# Patient Record
Sex: FEMALE | NOT HISPANIC OR LATINO | Employment: FULL TIME | URBAN - METROPOLITAN AREA
[De-identification: names, ages, dates, MRNs, and addresses within clinical notes are randomized per-mention and may not be internally consistent; named-entity substitution may affect disease eponyms.]

---

## 2020-09-04 ENCOUNTER — ROUTINE PRENATAL (OUTPATIENT)
Dept: OBSTETRICS AND GYNECOLOGY | Facility: CLINIC | Age: 30
End: 2020-09-04

## 2020-09-04 VITALS — WEIGHT: 135.9 LBS | DIASTOLIC BLOOD PRESSURE: 70 MMHG | SYSTOLIC BLOOD PRESSURE: 114 MMHG

## 2020-09-04 DIAGNOSIS — Z3A.28 28 WEEKS GESTATION OF PREGNANCY: Primary | ICD-10-CM

## 2020-09-04 LAB
GLUCOSE UR STRIP-MCNC: NEGATIVE MG/DL
GLUCOSE UR STRIP-MCNC: NEGATIVE MG/DL
PROT UR STRIP-MCNC: NEGATIVE MG/DL
PROT UR STRIP-MCNC: NEGATIVE MG/DL

## 2020-09-04 PROCEDURE — 0502F SUBSEQUENT PRENATAL CARE: CPT | Performed by: OBSTETRICS & GYNECOLOGY

## 2020-09-04 RX ORDER — BREAST PUMP
EACH MISCELLANEOUS
Qty: 1 EACH | Refills: 0 | Status: SHIPPED | OUTPATIENT
Start: 2020-09-04 | End: 2021-04-15

## 2020-09-04 NOTE — PROGRESS NOTES
OB FOLLOW UP    Marian Samuels is a 29 y.o.  27w6d patient being seen today for her obstetrical follow up visit. Patient reports no complaints.     Her prenatal care is complicated by (and status) :    There is no problem list on file for this patient.      ROS -   Patient Reports : No Problems  Patient Denies: No Problems  Fetal Movement : normal      /70   Wt 61.6 kg (135 lb 14.4 oz)   LMP 02/10/2020     Ultrasound Today: no    EXAM:  Vitals: See prenatal flowsheet   Abdomen: See prenatal flowsheet   Urine glucose/protein: See prenatal flowsheet   Pelvic: See prenatal flowsheet     Assessment    1. Pregnancy at 27w6d  2. Fetal status reassuring     Problem List Items Addressed This Visit     None      Visit Diagnoses     28 weeks gestation of pregnancy    -  Primary    Relevant Medications    Misc. Devices (BREAST PUMP) misc    Other Relevant Orders    POC Urinalysis Dipstick    Glucose, Post 50 Gm Glucola          Plan    1. glucola today, breast pump rx, fu 4 wks, plan US 34 wks    Mariluz Block MD  2020

## 2020-09-05 LAB
BLD GP AB SCN SERPL QL: NEGATIVE
ERYTHROCYTE [DISTWIDTH] IN BLOOD BY AUTOMATED COUNT: 11.7 % (ref 12.3–15.4)
GLUCOSE 1H P 50 G GLC PO SERPL-MCNC: 93 MG/DL (ref 65–139)
HCT VFR BLD AUTO: 27.4 % (ref 34–46.6)
HGB BLD-MCNC: 9.5 G/DL (ref 12–15.9)
MCH RBC QN AUTO: 31.3 PG (ref 26.6–33)
MCHC RBC AUTO-ENTMCNC: 34.7 G/DL (ref 31.5–35.7)
MCV RBC AUTO: 90.1 FL (ref 79–97)
PLATELET # BLD AUTO: 258 10*3/MM3 (ref 140–450)
RBC # BLD AUTO: 3.04 10*6/MM3 (ref 3.77–5.28)
WBC # BLD AUTO: 9.94 10*3/MM3 (ref 3.4–10.8)

## 2020-09-17 ENCOUNTER — TELEPHONE (OUTPATIENT)
Dept: OBSTETRICS AND GYNECOLOGY | Facility: CLINIC | Age: 30
End: 2020-09-17

## 2020-09-17 NOTE — TELEPHONE ENCOUNTER
Marian had a question about her breast pump, she said  sent a prosection for it and the pharmacy said they don't do that

## 2020-10-05 ENCOUNTER — ROUTINE PRENATAL (OUTPATIENT)
Dept: OBSTETRICS AND GYNECOLOGY | Facility: CLINIC | Age: 30
End: 2020-10-05

## 2020-10-05 VITALS — SYSTOLIC BLOOD PRESSURE: 98 MMHG | DIASTOLIC BLOOD PRESSURE: 60 MMHG | WEIGHT: 143 LBS

## 2020-10-05 DIAGNOSIS — Z3A.32 32 WEEKS GESTATION OF PREGNANCY: Primary | ICD-10-CM

## 2020-10-05 DIAGNOSIS — Z3A.28 28 WEEKS GESTATION OF PREGNANCY: ICD-10-CM

## 2020-10-05 DIAGNOSIS — R30.0 DYSURIA: ICD-10-CM

## 2020-10-05 LAB
BILIRUB BLD-MCNC: NEGATIVE MG/DL
GLUCOSE UR STRIP-MCNC: NEGATIVE MG/DL
KETONES UR QL STRIP: ABNORMAL
LEUKOCYTE EST, POC: ABNORMAL
NITRITE UR-MCNC: NEGATIVE MG/ML
PH UR: 7 [PH] (ref 5–8)
PROT UR STRIP-MCNC: ABNORMAL MG/DL
RBC # UR STRIP: NEGATIVE /UL
SP GR UR: 1.02 (ref 1–1.03)
UROBILINOGEN UR QL: NORMAL

## 2020-10-05 PROCEDURE — 0502F SUBSEQUENT PRENATAL CARE: CPT | Performed by: OBSTETRICS & GYNECOLOGY

## 2020-10-05 RX ORDER — PRENATAL VIT NO.126/IRON/FOLIC 28MG-0.8MG
TABLET ORAL DAILY
COMMUNITY
End: 2021-04-15

## 2020-10-05 NOTE — PROGRESS NOTES
"OB FOLLOW UP    Marian Samuels is a 30 y.o.  32w2d patient being seen today for her obstetrical follow up visit. Patient reports episode of \"gush of fuid\" during IC on 10/1.   She also reports intermittent episodes of decreased FM since last visit.  She has a doppler that she uses at home for reassurance.  Approx.2 wks ago she had dysuria that resolved without treatment.  CCUA today.         Pt reports sob over the past 2 weeks with minimal exertion.  She is taking PNV as well as iron and vitamin d.  She was anemic on 28wk labs.   ROS -      Vaginal bleeding denies    BP 98/60   Wt 64.9 kg (143 lb)   LMP 02/10/2020     FHT:  present   Pelvic Exam: Performed: No     Assessment    1. Pregnancy at 32w2d  2. Fetal status reassuring  3. Ultrasound was performed.     4. TDAP was not already given    Problem List Items Addressed This Visit     None      Visit Diagnoses     32 weeks gestation of pregnancy    -  Primary    Relevant Orders    US Fetal Biophysical Profile;Without Non-Stress Testing    US Ob Follow Up Transabdominal Approach    POC Urinalysis Dipstick (Completed)    Urine Culture - Urine, Urine, Clean Catch    28 weeks gestation of pregnancy        Dysuria        Relevant Orders    Urine Culture - Urine, Urine, Clean Catch          Plan      1. Reviewed kick counts.  2. anemia- discussed iron. she is having troubler taking, may order IV- check ferritin today  3. Ultrasound today was baby breech, repeat position check 36 wks  4. Follow up: 2 weeks  5. Call for any problems    Mariluz Block MD  10/05/2020  "

## 2020-10-06 LAB — FERRITIN SERPL-MCNC: 11.1 NG/ML (ref 13–150)

## 2020-10-07 LAB
BACTERIA UR CULT: NORMAL
BACTERIA UR CULT: NORMAL

## 2020-10-15 PROBLEM — D50.9 IRON DEFICIENCY ANEMIA DURING PREGNANCY: Status: ACTIVE | Noted: 2020-10-15

## 2020-10-15 PROBLEM — O99.019 IRON DEFICIENCY ANEMIA DURING PREGNANCY: Status: ACTIVE | Noted: 2020-10-15

## 2020-10-15 RX ORDER — DIPHENHYDRAMINE HYDROCHLORIDE 50 MG/ML
50 INJECTION INTRAMUSCULAR; INTRAVENOUS AS NEEDED
Status: CANCELLED | OUTPATIENT
Start: 2020-10-16

## 2020-10-22 ENCOUNTER — ROUTINE PRENATAL (OUTPATIENT)
Dept: OBSTETRICS AND GYNECOLOGY | Facility: CLINIC | Age: 30
End: 2020-10-22

## 2020-10-22 VITALS
HEIGHT: 62 IN | DIASTOLIC BLOOD PRESSURE: 60 MMHG | BODY MASS INDEX: 27.22 KG/M2 | SYSTOLIC BLOOD PRESSURE: 118 MMHG | WEIGHT: 147.9 LBS

## 2020-10-22 DIAGNOSIS — Z3A.34 34 WEEKS GESTATION OF PREGNANCY: Primary | ICD-10-CM

## 2020-10-22 LAB
EXPIRATION DATE: 0
GLUCOSE UR STRIP-MCNC: NEGATIVE MG/DL
Lab: 0
PROT UR STRIP-MCNC: NEGATIVE MG/DL

## 2020-10-22 PROCEDURE — 90471 IMMUNIZATION ADMIN: CPT | Performed by: OBSTETRICS & GYNECOLOGY

## 2020-10-22 PROCEDURE — 90715 TDAP VACCINE 7 YRS/> IM: CPT | Performed by: OBSTETRICS & GYNECOLOGY

## 2020-10-22 PROCEDURE — 0502F SUBSEQUENT PRENATAL CARE: CPT | Performed by: OBSTETRICS & GYNECOLOGY

## 2020-10-22 NOTE — PROGRESS NOTES
"OB FOLLOW UP  CC- Here for care of pregnancy        Marian Samuels is a 30 y.o.  34w5d patient being seen today for her obstetrical follow up visit. Patient reports no complaints.     Her prenatal care is complicated by (and status) :    Patient Active Problem List   Diagnosis   • Iron deficiency anemia during pregnancy       Flu Status: Already given in current flu season    The additional following portions of the patient's history were reviewed and updated as appropriate: allergies, current medications, past family history, past medical history, past social history, past surgical history and problem list.    ROS -   Patient Reports : No Problems  Patient Denies: Loss of Fluid, Vaginal Spotting, Vision Changes, Headaches and Cramping/Contractions   Fetal Movement : normal, > 10  All other systems reviewed and are negative.     I have reviewed and agree with the HPI, ROS, and historical information as entered above. Mariluz Block MD    /60   Ht 157.5 cm (62\")   Wt 67.1 kg (147 lb 14.4 oz)   LMP 02/10/2020   BMI 27.05 kg/m²     Ultrasound Today: yes    EXAM:   Vitals: See prenatal flowsheet   Abdomen: See prenatal flowsheet   Urine glucose/protein: See prenatal flowsheet   Pelvic: See prenatal flowsheet   HRT: See prenatal flowsheet   Presentation: See prenatal flowsheet   Movement: See prenatal flowsheet          Assessment and Plan    Problem List Items Addressed This Visit     None      Visit Diagnoses     34 weeks gestation of pregnancy    -  Primary    Relevant Orders    US Ob Limited 1 + Fetuses    POC Glucose, Urine, Qualitative, Dipstick (Completed)    POC Protein, Urine, Qualitative, Dipstick (Completed)    Tdap Vaccine Greater Than or Equal To 8yo IM (Completed)          1. Pregnancy at 34w5d  2. Fetal status reassuring.   3. Activity and Exercise discussed.  Return in about 2 weeks (around 2020) for F/U Prenatal.    Mariluz Block MD  10/22/2020  "

## 2020-10-26 ENCOUNTER — HOSPITAL ENCOUNTER (OUTPATIENT)
Dept: ONCOLOGY | Facility: HOSPITAL | Age: 30
Setting detail: INFUSION SERIES
Discharge: HOME | End: 2020-10-26

## 2020-10-26 VITALS
DIASTOLIC BLOOD PRESSURE: 60 MMHG | RESPIRATION RATE: 14 BRPM | SYSTOLIC BLOOD PRESSURE: 103 MMHG | HEART RATE: 81 BPM | BODY MASS INDEX: 26.72 KG/M2 | TEMPERATURE: 97.5 F | WEIGHT: 146.1 LBS

## 2020-10-26 DIAGNOSIS — O99.019 IRON DEFICIENCY ANEMIA DURING PREGNANCY: Primary | ICD-10-CM

## 2020-10-26 DIAGNOSIS — D50.9 IRON DEFICIENCY ANEMIA DURING PREGNANCY: Primary | ICD-10-CM

## 2020-10-26 PROCEDURE — 25010000002 FERRIC CARBOXYMALTOSE 750 MG/15ML SOLUTION 15 ML VIAL: Performed by: OBSTETRICS & GYNECOLOGY

## 2020-10-26 PROCEDURE — 96375 TX/PRO/DX INJ NEW DRUG ADDON: CPT

## 2020-10-26 PROCEDURE — 96374 THER/PROPH/DIAG INJ IV PUSH: CPT

## 2020-10-26 RX ORDER — METHYLPREDNISOLONE SODIUM SUCCINATE 125 MG/2ML
125 INJECTION, POWDER, LYOPHILIZED, FOR SOLUTION INTRAMUSCULAR; INTRAVENOUS AS NEEDED
Status: CANCELLED | OUTPATIENT
Start: 2020-11-02

## 2020-10-26 RX ORDER — DIPHENHYDRAMINE HYDROCHLORIDE 50 MG/ML
50 INJECTION INTRAMUSCULAR; INTRAVENOUS AS NEEDED
Status: CANCELLED | OUTPATIENT
Start: 2020-11-02

## 2020-10-26 RX ADMIN — FERRIC CARBOXYMALTOSE INJECTION 750 MG: 50 INJECTION, SOLUTION INTRAVENOUS at 15:47

## 2020-10-30 ENCOUNTER — APPOINTMENT (OUTPATIENT)
Dept: ONCOLOGY | Facility: HOSPITAL | Age: 30
End: 2020-10-30

## 2020-11-02 ENCOUNTER — HOSPITAL ENCOUNTER (OUTPATIENT)
Dept: ONCOLOGY | Facility: HOSPITAL | Age: 30
Setting detail: INFUSION SERIES
Discharge: HOME | End: 2020-11-02

## 2020-11-02 VITALS
TEMPERATURE: 97.5 F | RESPIRATION RATE: 16 BRPM | HEART RATE: 89 BPM | DIASTOLIC BLOOD PRESSURE: 61 MMHG | HEIGHT: 62 IN | BODY MASS INDEX: 27.05 KG/M2 | WEIGHT: 147 LBS | SYSTOLIC BLOOD PRESSURE: 97 MMHG

## 2020-11-02 DIAGNOSIS — O99.019 IRON DEFICIENCY ANEMIA DURING PREGNANCY: Primary | ICD-10-CM

## 2020-11-02 DIAGNOSIS — D50.9 IRON DEFICIENCY ANEMIA DURING PREGNANCY: Primary | ICD-10-CM

## 2020-11-02 PROCEDURE — 25010000002 FERRIC CARBOXYMALTOSE 750 MG/15ML SOLUTION 15 ML VIAL: Performed by: OBSTETRICS & GYNECOLOGY

## 2020-11-02 PROCEDURE — 96374 THER/PROPH/DIAG INJ IV PUSH: CPT

## 2020-11-02 RX ORDER — DIPHENHYDRAMINE HYDROCHLORIDE 50 MG/ML
50 INJECTION INTRAMUSCULAR; INTRAVENOUS AS NEEDED
OUTPATIENT
Start: 2020-11-09

## 2020-11-02 RX ORDER — METHYLPREDNISOLONE SODIUM SUCCINATE 125 MG/2ML
125 INJECTION, POWDER, LYOPHILIZED, FOR SOLUTION INTRAMUSCULAR; INTRAVENOUS AS NEEDED
OUTPATIENT
Start: 2020-11-09

## 2020-11-02 RX ADMIN — FERRIC CARBOXYMALTOSE INJECTION 750 MG: 50 INJECTION, SOLUTION INTRAVENOUS at 08:35

## 2020-11-03 ENCOUNTER — ROUTINE PRENATAL (OUTPATIENT)
Dept: OBSTETRICS AND GYNECOLOGY | Facility: CLINIC | Age: 30
End: 2020-11-03

## 2020-11-03 VITALS — DIASTOLIC BLOOD PRESSURE: 66 MMHG | WEIGHT: 147.2 LBS | BODY MASS INDEX: 26.92 KG/M2 | SYSTOLIC BLOOD PRESSURE: 108 MMHG

## 2020-11-03 DIAGNOSIS — Z34.90 PREGNANCY, UNSPECIFIED GESTATIONAL AGE: ICD-10-CM

## 2020-11-03 DIAGNOSIS — Z3A.36 36 WEEKS GESTATION OF PREGNANCY: Primary | ICD-10-CM

## 2020-11-03 LAB
EXPIRATION DATE: 0
GLUCOSE UR STRIP-MCNC: NEGATIVE MG/DL
Lab: 0
PROT UR STRIP-MCNC: NEGATIVE MG/DL

## 2020-11-03 PROCEDURE — 87081 CULTURE SCREEN ONLY: CPT

## 2020-11-03 PROCEDURE — 0502F SUBSEQUENT PRENATAL CARE: CPT | Performed by: OBSTETRICS & GYNECOLOGY

## 2020-11-03 NOTE — PROGRESS NOTES
OB FOLLOW UP  CC- Here for care of pregnancy        Marian Samuels is a 30 y.o.  36w3d patient being seen today for her obstetrical follow up visit. Patient reports occasional Chester Zapata. Received 2nd dose of IV iron infusion yesterday. Patient states she feels much better.    Her prenatal care is complicated by (and status) :    Patient Active Problem List   Diagnosis   • Iron deficiency anemia during pregnancy       Flu Status: Already given in current flu season    The additional following portions of the patient's history were reviewed and updated as appropriate: allergies, current medications, past family history, past medical history, past social history, past surgical history and problem list.    ROS -   Patient Reports : occasional [place] Zapata  Patient Denies: Loss of Fluid, Vaginal Spotting, Vision Changes, Headaches, Nausea , Vomiting  and Epigastric pain  Fetal Movement : normal  All other systems reviewed and are negative.     GBS Status: Done Today  Her Delivery Plan is: wanting an IOL    I have reviewed and agree with the HPI, ROS, and historical information as entered above. Mariluz Block MD    Ultrasound Today: No.    /66   Wt 66.8 kg (147 lb 3.2 oz)   LMP 02/10/2020   BMI 26.92 kg/m²     EXAM:     FHT: + BPM   Uterine Size: 36 cm    Urine glucose/protein: See prenatal flowsheet       Assessment and Plan    Problem List Items Addressed This Visit     None      Visit Diagnoses     36 weeks gestation of pregnancy    -  Primary    Relevant Orders    Group B Streptococcus Culture - Swab, Vaginal/Rectum    US Ob Follow Up Transabdominal Approach    Pregnancy, unspecified gestational age        Relevant Orders    POC Glucose, Urine, Qualitative, Dipstick (Completed)    POC Protein, Urine, Qualitative, Dipstick (Completed)          1. Pregnancy at 36w3d  2. Fetal status reassuring.   3. Activity and Exercise discussed.  Return in about 1 week (around 11/10/2020) for F/U Prenatal, U/S Next  Visit. to check growth. Will sched 39 wk induction.     Mariluz Block MD  11/03/2020

## 2020-11-04 ENCOUNTER — LAB (OUTPATIENT)
Dept: LAB | Facility: HOSPITAL | Age: 30
End: 2020-11-04

## 2020-11-04 DIAGNOSIS — Z3A.36 36 WEEKS GESTATION OF PREGNANCY: Primary | ICD-10-CM

## 2020-11-06 ENCOUNTER — APPOINTMENT (OUTPATIENT)
Dept: ONCOLOGY | Facility: HOSPITAL | Age: 30
End: 2020-11-06

## 2020-11-06 LAB
BACTERIA SPEC AEROBE CULT: ABNORMAL
STREP GROUPING: ABNORMAL

## 2020-11-12 ENCOUNTER — ROUTINE PRENATAL (OUTPATIENT)
Dept: OBSTETRICS AND GYNECOLOGY | Facility: CLINIC | Age: 30
End: 2020-11-12

## 2020-11-12 VITALS — WEIGHT: 145 LBS | BODY MASS INDEX: 26.52 KG/M2 | SYSTOLIC BLOOD PRESSURE: 90 MMHG | DIASTOLIC BLOOD PRESSURE: 58 MMHG

## 2020-11-12 DIAGNOSIS — Z3A.37 37 WEEKS GESTATION OF PREGNANCY: Primary | ICD-10-CM

## 2020-11-12 PROBLEM — Z34.90 PREGNANCY: Status: ACTIVE | Noted: 2020-11-12

## 2020-11-12 PROCEDURE — 0502F SUBSEQUENT PRENATAL CARE: CPT | Performed by: OBSTETRICS & GYNECOLOGY

## 2020-11-12 NOTE — PROGRESS NOTES
OB FOLLOW UP  CC- Here for care of pregnancy        Marian Samuels is a 30 y.o.  37w5d patient being seen today for her obstetrical follow up visit. Patient reports occasional ctx.  Denies vag bldg, lof, dysuria, n/v, HA. U/s today BPP 8/8, EFW 27th%. Pt has h/o 3rd degree tear.            Her prenatal care is complicated by (and status) :    Patient Active Problem List   Diagnosis   • Iron deficiency anemia during pregnancy   • Pregnancy         Flu Status: Already given in current flu season as well as TDAP  GBS Status: Was already done and it was positive.   Her Delivery Plan is: Desires IOL at 39wks. Scheduled  Ultrasound Today: Yes.  Findings showed BPP 8/8, EFW 27th%.  I have personally evaluated the U/S and agree with the findings. Mariluz Block MD    ROS -   Patient Reports : occasional ctx  Patient Denies: Loss of Fluid, Vaginal Spotting, Vision Changes, Headaches, Nausea  and Vomiting   Fetal Movement : normal  All other systems reviewed and are negative.       The additional following portions of the patient's history were reviewed and updated as appropriate: allergies, current medications, past family history, past medical history, past social history, past surgical history and problem list.    I have reviewed and agree with the HPI, ROS, and historical information as entered above. Mariluz Block MD        BP 90/58   Wt 65.8 kg (145 lb)   LMP 02/10/2020   BMI 26.52 kg/m²     EXAM:     FHT: + BPM     Pelvic Exam: yes    Urine glucose/protein: See prenatal flowsheet       Assessment and Plan    Problem List Items Addressed This Visit     Pregnancy - Primary    Overview     H/o , 3rd degree               1. Pregnancy at 37w5d  2. Fetal status reassuring.   3. Activity and Exercise discussed.  Return in about 1 week (around 2020) for F/U Prenatal.   Induction scheduled.     Mariluz Block MD  2020

## 2020-11-17 ENCOUNTER — ROUTINE PRENATAL (OUTPATIENT)
Dept: OBSTETRICS AND GYNECOLOGY | Facility: CLINIC | Age: 30
End: 2020-11-17

## 2020-11-17 ENCOUNTER — PREP FOR SURGERY (OUTPATIENT)
Dept: OTHER | Facility: HOSPITAL | Age: 30
End: 2020-11-17

## 2020-11-17 VITALS — WEIGHT: 146 LBS | BODY MASS INDEX: 26.7 KG/M2 | DIASTOLIC BLOOD PRESSURE: 60 MMHG | SYSTOLIC BLOOD PRESSURE: 100 MMHG

## 2020-11-17 DIAGNOSIS — Z3A.39 39 WEEKS GESTATION OF PREGNANCY: Primary | ICD-10-CM

## 2020-11-17 DIAGNOSIS — Z3A.38 38 WEEKS GESTATION OF PREGNANCY: Primary | ICD-10-CM

## 2020-11-17 LAB
GLUCOSE UR STRIP-MCNC: NEGATIVE MG/DL
PROT UR STRIP-MCNC: NEGATIVE MG/DL

## 2020-11-17 PROCEDURE — 59425 ANTEPARTUM CARE ONLY: CPT | Performed by: OBSTETRICS & GYNECOLOGY

## 2020-11-17 RX ORDER — PROMETHAZINE HYDROCHLORIDE 12.5 MG/1
12.5 TABLET ORAL EVERY 6 HOURS PRN
Status: CANCELLED | OUTPATIENT
Start: 2020-11-17

## 2020-11-17 RX ORDER — OXYTOCIN-SODIUM CHLORIDE 0.9% IV SOLN 30 UNIT/500ML 30-0.9/5 UT/ML-%
2-30 SOLUTION INTRAVENOUS
Status: CANCELLED | OUTPATIENT
Start: 2020-11-18

## 2020-11-17 RX ORDER — METHYLERGONOVINE MALEATE 0.2 MG/ML
200 INJECTION INTRAVENOUS ONCE AS NEEDED
Status: CANCELLED | OUTPATIENT
Start: 2020-11-17

## 2020-11-17 RX ORDER — ACETAMINOPHEN 325 MG/1
650 TABLET ORAL EVERY 4 HOURS PRN
Status: CANCELLED | OUTPATIENT
Start: 2020-11-17

## 2020-11-17 RX ORDER — CARBOPROST TROMETHAMINE 250 UG/ML
250 INJECTION, SOLUTION INTRAMUSCULAR AS NEEDED
Status: CANCELLED | OUTPATIENT
Start: 2020-11-17

## 2020-11-17 RX ORDER — OXYCODONE AND ACETAMINOPHEN 7.5; 325 MG/1; MG/1
2 TABLET ORAL EVERY 4 HOURS PRN
Status: CANCELLED | OUTPATIENT
Start: 2020-11-17 | End: 2020-11-27

## 2020-11-17 RX ORDER — PROMETHAZINE HYDROCHLORIDE 12.5 MG/1
12.5 SUPPOSITORY RECTAL EVERY 6 HOURS PRN
Status: CANCELLED | OUTPATIENT
Start: 2020-11-17

## 2020-11-17 RX ORDER — SODIUM CHLORIDE 0.9 % (FLUSH) 0.9 %
10 SYRINGE (ML) INJECTION AS NEEDED
Status: CANCELLED | OUTPATIENT
Start: 2020-11-17

## 2020-11-17 RX ORDER — SODIUM CHLORIDE, SODIUM LACTATE, POTASSIUM CHLORIDE, CALCIUM CHLORIDE 600; 310; 30; 20 MG/100ML; MG/100ML; MG/100ML; MG/100ML
125 INJECTION, SOLUTION INTRAVENOUS CONTINUOUS
Status: CANCELLED | OUTPATIENT
Start: 2020-11-17

## 2020-11-17 RX ORDER — MORPHINE SULFATE 2 MG/ML
2 INJECTION, SOLUTION INTRAMUSCULAR; INTRAVENOUS
Status: CANCELLED | OUTPATIENT
Start: 2020-11-17 | End: 2020-11-27

## 2020-11-17 RX ORDER — MISOPROSTOL 200 UG/1
800 TABLET ORAL AS NEEDED
Status: CANCELLED | OUTPATIENT
Start: 2020-11-17

## 2020-11-17 RX ORDER — MAGNESIUM CARB/ALUMINUM HYDROX 105-160MG
30 TABLET,CHEWABLE ORAL ONCE
Status: CANCELLED | OUTPATIENT
Start: 2020-11-17 | End: 2020-11-17

## 2020-11-17 RX ORDER — OXYTOCIN-SODIUM CHLORIDE 0.9% IV SOLN 30 UNIT/500ML 30-0.9/5 UT/ML-%
85 SOLUTION INTRAVENOUS ONCE
Status: CANCELLED | OUTPATIENT
Start: 2020-11-17

## 2020-11-17 RX ORDER — SODIUM CHLORIDE 0.9 % (FLUSH) 0.9 %
3 SYRINGE (ML) INJECTION EVERY 12 HOURS SCHEDULED
Status: CANCELLED | OUTPATIENT
Start: 2020-11-17

## 2020-11-17 RX ORDER — LIDOCAINE HYDROCHLORIDE 10 MG/ML
5 INJECTION, SOLUTION EPIDURAL; INFILTRATION; INTRACAUDAL; PERINEURAL AS NEEDED
Status: CANCELLED | OUTPATIENT
Start: 2020-11-17

## 2020-11-17 RX ORDER — OXYTOCIN-SODIUM CHLORIDE 0.9% IV SOLN 30 UNIT/500ML 30-0.9/5 UT/ML-%
650 SOLUTION INTRAVENOUS ONCE
Status: CANCELLED | OUTPATIENT
Start: 2020-11-17

## 2020-11-17 NOTE — PROGRESS NOTES
OB FOLLOW UP  CC- Here for care of pregnancy        Marian Samuels is a 30 y.o.  38w3d patient being seen today for her obstetrical follow up visit. Patient reports no complaints.     Her prenatal care is complicated by (and status) :    Patient Active Problem List   Diagnosis   • Iron deficiency anemia during pregnancy   • Pregnancy         Flu Status: Already given in current flu season  GBS Status: Was already done and it was positive.   Her Delivery Plan is: Desires IOL at 39wks. Scheduled  Ultrasound Today: No.    ROS -   Patient Reports : No Problems  Patient Denies: Loss of Fluid, Vaginal Spotting, Vision Changes, Headaches, Nausea  and Contractions  Fetal Movement : normal  All other systems reviewed and are negative.       The additional following portions of the patient's history were reviewed and updated as appropriate: allergies, current medications, past family history, past medical history, past social history, past surgical history and problem list.    I have reviewed and agree with the HPI, ROS, and historical information as entered above. Mariluz Block MD        /60   Wt 66.2 kg (146 lb)   LMP 02/10/2020   BMI 26.70 kg/m²     EXAM:     FHT: + BPM   Uterine Size: size equals dates  Pelvic Exam: yes    Urine glucose/protein: See prenatal flowsheet       Assessment and Plan    Problem List Items Addressed This Visit     Pregnancy - Primary    Overview     H/o , 3rd degree         Relevant Orders    POC Urinalysis Dipstick (Completed)          1. Pregnancy at 38w3d  2. Fetal status reassuring.   3. Activity and Exercise discussed.  No follow-ups on file. induction Monday night    Mariluz Block MD  2020

## 2020-11-20 ENCOUNTER — APPOINTMENT (OUTPATIENT)
Dept: PREADMISSION TESTING | Facility: HOSPITAL | Age: 30
End: 2020-11-20

## 2020-11-20 PROCEDURE — C9803 HOPD COVID-19 SPEC COLLECT: HCPCS

## 2020-11-20 PROCEDURE — U0004 COV-19 TEST NON-CDC HGH THRU: HCPCS

## 2020-11-21 LAB — SARS-COV-2 RNA RESP QL NAA+PROBE: NOT DETECTED

## 2020-11-23 ENCOUNTER — HOSPITAL ENCOUNTER (INPATIENT)
Dept: LABOR AND DELIVERY | Facility: HOSPITAL | Age: 30
LOS: 3 days | Discharge: HOME | End: 2020-11-26
Attending: OBSTETRICS & GYNECOLOGY | Admitting: OBSTETRICS & GYNECOLOGY

## 2020-11-23 DIAGNOSIS — Z3A.39 39 WEEKS GESTATION OF PREGNANCY: ICD-10-CM

## 2020-11-23 PROBLEM — Z34.90 CURRENTLY PREGNANT: Status: ACTIVE | Noted: 2020-11-23

## 2020-11-23 LAB
DEPRECATED RDW RBC AUTO: 58.4 FL (ref 37–54)
ERYTHROCYTE [DISTWIDTH] IN BLOOD BY AUTOMATED COUNT: 17.3 % (ref 12.3–15.4)
HCT VFR BLD AUTO: 36.2 % (ref 34–46.6)
HGB BLD-MCNC: 12 G/DL (ref 12–15.9)
MCH RBC QN AUTO: 30.9 PG (ref 26.6–33)
MCHC RBC AUTO-ENTMCNC: 33.1 G/DL (ref 31.5–35.7)
MCV RBC AUTO: 93.3 FL (ref 79–97)
PLATELET # BLD AUTO: 209 10*3/MM3 (ref 140–450)
PMV BLD AUTO: 10.9 FL (ref 6–12)
RBC # BLD AUTO: 3.88 10*6/MM3 (ref 3.77–5.28)
WBC # BLD AUTO: 11.34 10*3/MM3 (ref 3.4–10.8)

## 2020-11-23 PROCEDURE — 85027 COMPLETE CBC AUTOMATED: CPT | Performed by: OBSTETRICS & GYNECOLOGY

## 2020-11-23 PROCEDURE — 86850 RBC ANTIBODY SCREEN: CPT | Performed by: OBSTETRICS & GYNECOLOGY

## 2020-11-23 PROCEDURE — 86901 BLOOD TYPING SEROLOGIC RH(D): CPT

## 2020-11-23 PROCEDURE — 59200 INSERT CERVICAL DILATOR: CPT | Performed by: OBSTETRICS & GYNECOLOGY

## 2020-11-23 PROCEDURE — 86901 BLOOD TYPING SEROLOGIC RH(D): CPT | Performed by: OBSTETRICS & GYNECOLOGY

## 2020-11-23 PROCEDURE — 59025 FETAL NON-STRESS TEST: CPT

## 2020-11-23 PROCEDURE — 25010000002 PENICILLIN G POTASSIUM PER 600000 UNITS: Performed by: OBSTETRICS & GYNECOLOGY

## 2020-11-23 PROCEDURE — 86900 BLOOD TYPING SEROLOGIC ABO: CPT

## 2020-11-23 PROCEDURE — 86900 BLOOD TYPING SEROLOGIC ABO: CPT | Performed by: OBSTETRICS & GYNECOLOGY

## 2020-11-23 RX ORDER — SODIUM CHLORIDE 0.9 % (FLUSH) 0.9 %
10 SYRINGE (ML) INJECTION AS NEEDED
Status: DISCONTINUED | OUTPATIENT
Start: 2020-11-23 | End: 2020-11-24 | Stop reason: HOSPADM

## 2020-11-23 RX ORDER — SODIUM CHLORIDE 0.9 % (FLUSH) 0.9 %
3 SYRINGE (ML) INJECTION EVERY 12 HOURS SCHEDULED
Status: DISCONTINUED | OUTPATIENT
Start: 2020-11-23 | End: 2020-11-24 | Stop reason: HOSPADM

## 2020-11-23 RX ORDER — SODIUM CHLORIDE, SODIUM LACTATE, POTASSIUM CHLORIDE, CALCIUM CHLORIDE 600; 310; 30; 20 MG/100ML; MG/100ML; MG/100ML; MG/100ML
125 INJECTION, SOLUTION INTRAVENOUS CONTINUOUS
Status: DISCONTINUED | OUTPATIENT
Start: 2020-11-23 | End: 2020-11-24

## 2020-11-23 RX ORDER — OXYTOCIN-SODIUM CHLORIDE 0.9% IV SOLN 30 UNIT/500ML 30-0.9/5 UT/ML-%
2-30 SOLUTION INTRAVENOUS
Status: DISCONTINUED | OUTPATIENT
Start: 2020-11-23 | End: 2020-11-24 | Stop reason: HOSPADM

## 2020-11-23 RX ORDER — PENICILLIN G 3000000 [IU]/50ML
3 INJECTION, SOLUTION INTRAVENOUS EVERY 4 HOURS
Status: DISCONTINUED | OUTPATIENT
Start: 2020-11-24 | End: 2020-11-24 | Stop reason: HOSPADM

## 2020-11-23 RX ORDER — LIDOCAINE HYDROCHLORIDE 10 MG/ML
5 INJECTION, SOLUTION EPIDURAL; INFILTRATION; INTRACAUDAL; PERINEURAL AS NEEDED
Status: DISCONTINUED | OUTPATIENT
Start: 2020-11-23 | End: 2020-11-24 | Stop reason: HOSPADM

## 2020-11-23 RX ORDER — MAGNESIUM CARB/ALUMINUM HYDROX 105-160MG
30 TABLET,CHEWABLE ORAL ONCE
Status: DISCONTINUED | OUTPATIENT
Start: 2020-11-23 | End: 2020-11-24 | Stop reason: HOSPADM

## 2020-11-23 RX ADMIN — SODIUM CHLORIDE 5 MILLION UNITS: 900 INJECTION INTRAVENOUS at 22:25

## 2020-11-23 RX ADMIN — SODIUM CHLORIDE, POTASSIUM CHLORIDE, SODIUM LACTATE AND CALCIUM CHLORIDE 125 ML/HR: 600; 310; 30; 20 INJECTION, SOLUTION INTRAVENOUS at 22:15

## 2020-11-24 ENCOUNTER — ANESTHESIA EVENT (OUTPATIENT)
Dept: LABOR AND DELIVERY | Facility: HOSPITAL | Age: 30
End: 2020-11-24

## 2020-11-24 ENCOUNTER — ANESTHESIA (OUTPATIENT)
Dept: LABOR AND DELIVERY | Facility: HOSPITAL | Age: 30
End: 2020-11-24

## 2020-11-24 LAB
ABO GROUP BLD: NORMAL
BLD GP AB SCN SERPL QL: NEGATIVE
RH BLD: POSITIVE
T&S EXPIRATION DATE: NORMAL

## 2020-11-24 PROCEDURE — 25010000002 FENTANYL CITRATE (PF) 100 MCG/2ML SOLUTION: Performed by: ANESTHESIOLOGY

## 2020-11-24 PROCEDURE — 3E033VJ INTRODUCTION OF OTHER HORMONE INTO PERIPHERAL VEIN, PERCUTANEOUS APPROACH: ICD-10-PCS | Performed by: OBSTETRICS & GYNECOLOGY

## 2020-11-24 PROCEDURE — 25010000002 PENICILLIN G POTASSIUM PER 600000 UNITS: Performed by: OBSTETRICS & GYNECOLOGY

## 2020-11-24 PROCEDURE — S0260 H&P FOR SURGERY: HCPCS | Performed by: OBSTETRICS & GYNECOLOGY

## 2020-11-24 PROCEDURE — 59410 OBSTETRICAL CARE: CPT | Performed by: OBSTETRICS & GYNECOLOGY

## 2020-11-24 PROCEDURE — 10907ZC DRAINAGE OF AMNIOTIC FLUID, THERAPEUTIC FROM PRODUCTS OF CONCEPTION, VIA NATURAL OR ARTIFICIAL OPENING: ICD-10-PCS | Performed by: OBSTETRICS & GYNECOLOGY

## 2020-11-24 PROCEDURE — C1755 CATHETER, INTRASPINAL: HCPCS

## 2020-11-24 PROCEDURE — 0KQM0ZZ REPAIR PERINEUM MUSCLE, OPEN APPROACH: ICD-10-PCS | Performed by: OBSTETRICS & GYNECOLOGY

## 2020-11-24 RX ORDER — OXYCODONE HYDROCHLORIDE AND ACETAMINOPHEN 5; 325 MG/1; MG/1
1 TABLET ORAL EVERY 4 HOURS PRN
Status: DISCONTINUED | OUTPATIENT
Start: 2020-11-24 | End: 2020-11-26 | Stop reason: HOSPADM

## 2020-11-24 RX ORDER — OXYTOCIN-SODIUM CHLORIDE 0.9% IV SOLN 30 UNIT/500ML 30-0.9/5 UT/ML-%
650 SOLUTION INTRAVENOUS ONCE
Status: COMPLETED | OUTPATIENT
Start: 2020-11-24 | End: 2020-11-24

## 2020-11-24 RX ORDER — TRISODIUM CITRATE DIHYDRATE AND CITRIC ACID MONOHYDRATE 500; 334 MG/5ML; MG/5ML
30 SOLUTION ORAL ONCE
Status: DISCONTINUED | OUTPATIENT
Start: 2020-11-24 | End: 2020-11-24 | Stop reason: HOSPADM

## 2020-11-24 RX ORDER — POLYETHYLENE GLYCOL 3350 17 G/17G
17 POWDER, FOR SOLUTION ORAL DAILY
Status: DISCONTINUED | OUTPATIENT
Start: 2020-11-24 | End: 2020-11-24

## 2020-11-24 RX ORDER — DIPHENHYDRAMINE HYDROCHLORIDE 50 MG/ML
12.5 INJECTION INTRAMUSCULAR; INTRAVENOUS EVERY 8 HOURS PRN
Status: DISCONTINUED | OUTPATIENT
Start: 2020-11-24 | End: 2020-11-24 | Stop reason: HOSPADM

## 2020-11-24 RX ORDER — DOCUSATE SODIUM 100 MG/1
100 CAPSULE, LIQUID FILLED ORAL 2 TIMES DAILY
Status: DISCONTINUED | OUTPATIENT
Start: 2020-11-24 | End: 2020-11-26 | Stop reason: HOSPADM

## 2020-11-24 RX ORDER — POLYETHYLENE GLYCOL 3350 17 G/17G
17 POWDER, FOR SOLUTION ORAL 2 TIMES DAILY PRN
Status: DISCONTINUED | OUTPATIENT
Start: 2020-11-24 | End: 2020-11-26 | Stop reason: HOSPADM

## 2020-11-24 RX ORDER — FAMOTIDINE 10 MG/ML
20 INJECTION, SOLUTION INTRAVENOUS ONCE AS NEEDED
Status: DISCONTINUED | OUTPATIENT
Start: 2020-11-24 | End: 2020-11-24 | Stop reason: HOSPADM

## 2020-11-24 RX ORDER — ACETAMINOPHEN 325 MG/1
650 TABLET ORAL EVERY 4 HOURS PRN
Status: DISCONTINUED | OUTPATIENT
Start: 2020-11-24 | End: 2020-11-24 | Stop reason: HOSPADM

## 2020-11-24 RX ORDER — MISOPROSTOL 200 UG/1
800 TABLET ORAL AS NEEDED
Status: DISCONTINUED | OUTPATIENT
Start: 2020-11-24 | End: 2020-11-24 | Stop reason: HOSPADM

## 2020-11-24 RX ORDER — PROMETHAZINE HYDROCHLORIDE 12.5 MG/1
12.5 TABLET ORAL EVERY 4 HOURS PRN
Status: DISCONTINUED | OUTPATIENT
Start: 2020-11-24 | End: 2020-11-26 | Stop reason: HOSPADM

## 2020-11-24 RX ORDER — ONDANSETRON 4 MG/1
4 TABLET, FILM COATED ORAL EVERY 6 HOURS PRN
Status: DISCONTINUED | OUTPATIENT
Start: 2020-11-24 | End: 2020-11-26 | Stop reason: HOSPADM

## 2020-11-24 RX ORDER — BISACODYL 10 MG
10 SUPPOSITORY, RECTAL RECTAL DAILY PRN
Status: DISCONTINUED | OUTPATIENT
Start: 2020-11-25 | End: 2020-11-26 | Stop reason: HOSPADM

## 2020-11-24 RX ORDER — ONDANSETRON 2 MG/ML
4 INJECTION INTRAMUSCULAR; INTRAVENOUS EVERY 6 HOURS PRN
Status: DISCONTINUED | OUTPATIENT
Start: 2020-11-24 | End: 2020-11-26 | Stop reason: HOSPADM

## 2020-11-24 RX ORDER — LIDOCAINE HYDROCHLORIDE AND EPINEPHRINE 15; 5 MG/ML; UG/ML
INJECTION, SOLUTION EPIDURAL AS NEEDED
Status: DISCONTINUED | OUTPATIENT
Start: 2020-11-24 | End: 2020-11-24 | Stop reason: SURG

## 2020-11-24 RX ORDER — MORPHINE SULFATE 2 MG/ML
2 INJECTION, SOLUTION INTRAMUSCULAR; INTRAVENOUS
Status: DISCONTINUED | OUTPATIENT
Start: 2020-11-24 | End: 2020-11-24 | Stop reason: HOSPADM

## 2020-11-24 RX ORDER — METHYLERGONOVINE MALEATE 0.2 MG/ML
200 INJECTION INTRAVENOUS ONCE AS NEEDED
Status: DISCONTINUED | OUTPATIENT
Start: 2020-11-24 | End: 2020-11-24 | Stop reason: HOSPADM

## 2020-11-24 RX ORDER — MORPHINE SULFATE 2 MG/ML
2 INJECTION, SOLUTION INTRAMUSCULAR; INTRAVENOUS
Status: DISCONTINUED | OUTPATIENT
Start: 2020-11-24 | End: 2020-11-24 | Stop reason: SDUPTHER

## 2020-11-24 RX ORDER — EPHEDRINE SULFATE 5 MG/ML
10 INJECTION INTRAVENOUS
Status: DISCONTINUED | OUTPATIENT
Start: 2020-11-24 | End: 2020-11-24 | Stop reason: HOSPADM

## 2020-11-24 RX ORDER — PROMETHAZINE HYDROCHLORIDE 12.5 MG/1
12.5 SUPPOSITORY RECTAL EVERY 6 HOURS PRN
Status: DISCONTINUED | OUTPATIENT
Start: 2020-11-24 | End: 2020-11-24 | Stop reason: HOSPADM

## 2020-11-24 RX ORDER — CARBOPROST TROMETHAMINE 250 UG/ML
250 INJECTION, SOLUTION INTRAMUSCULAR AS NEEDED
Status: DISCONTINUED | OUTPATIENT
Start: 2020-11-24 | End: 2020-11-24 | Stop reason: HOSPADM

## 2020-11-24 RX ORDER — SODIUM CHLORIDE 0.9 % (FLUSH) 0.9 %
1-10 SYRINGE (ML) INJECTION AS NEEDED
Status: DISCONTINUED | OUTPATIENT
Start: 2020-11-24 | End: 2020-11-26 | Stop reason: HOSPADM

## 2020-11-24 RX ORDER — HYDROCODONE BITARTRATE AND ACETAMINOPHEN 5; 325 MG/1; MG/1
1 TABLET ORAL EVERY 4 HOURS PRN
Status: DISCONTINUED | OUTPATIENT
Start: 2020-11-24 | End: 2020-11-26 | Stop reason: HOSPADM

## 2020-11-24 RX ORDER — ROPIVACAINE HYDROCHLORIDE 2 MG/ML
15 INJECTION, SOLUTION EPIDURAL; INFILTRATION; PERINEURAL CONTINUOUS
Status: DISCONTINUED | OUTPATIENT
Start: 2020-11-24 | End: 2020-11-24

## 2020-11-24 RX ORDER — OXYCODONE AND ACETAMINOPHEN 7.5; 325 MG/1; MG/1
2 TABLET ORAL EVERY 4 HOURS PRN
Status: DISCONTINUED | OUTPATIENT
Start: 2020-11-24 | End: 2020-11-24 | Stop reason: HOSPADM

## 2020-11-24 RX ORDER — FENTANYL CITRATE 50 UG/ML
INJECTION, SOLUTION INTRAMUSCULAR; INTRAVENOUS AS NEEDED
Status: DISCONTINUED | OUTPATIENT
Start: 2020-11-24 | End: 2020-11-24 | Stop reason: SURG

## 2020-11-24 RX ORDER — OXYTOCIN-SODIUM CHLORIDE 0.9% IV SOLN 30 UNIT/500ML 30-0.9/5 UT/ML-%
85 SOLUTION INTRAVENOUS ONCE
Status: DISCONTINUED | OUTPATIENT
Start: 2020-11-24 | End: 2020-11-24 | Stop reason: HOSPADM

## 2020-11-24 RX ORDER — ONDANSETRON 2 MG/ML
4 INJECTION INTRAMUSCULAR; INTRAVENOUS ONCE AS NEEDED
Status: DISCONTINUED | OUTPATIENT
Start: 2020-11-24 | End: 2020-11-24 | Stop reason: HOSPADM

## 2020-11-24 RX ORDER — BUPIVACAINE HYDROCHLORIDE 5 MG/ML
INJECTION, SOLUTION EPIDURAL; INTRACAUDAL AS NEEDED
Status: DISCONTINUED | OUTPATIENT
Start: 2020-11-24 | End: 2020-11-24 | Stop reason: SURG

## 2020-11-24 RX ORDER — LANOLIN
CREAM (ML) TOPICAL
Status: DISCONTINUED | OUTPATIENT
Start: 2020-11-24 | End: 2020-11-26 | Stop reason: HOSPADM

## 2020-11-24 RX ORDER — IBUPROFEN 600 MG/1
600 TABLET ORAL EVERY 6 HOURS PRN
Status: DISCONTINUED | OUTPATIENT
Start: 2020-11-24 | End: 2020-11-26 | Stop reason: HOSPADM

## 2020-11-24 RX ORDER — HYDROCORTISONE 25 MG/G
1 CREAM TOPICAL AS NEEDED
Status: DISCONTINUED | OUTPATIENT
Start: 2020-11-24 | End: 2020-11-26 | Stop reason: HOSPADM

## 2020-11-24 RX ORDER — MORPHINE SULFATE 2 MG/ML
2 INJECTION, SOLUTION INTRAMUSCULAR; INTRAVENOUS
Status: DISCONTINUED | OUTPATIENT
Start: 2020-11-24 | End: 2020-11-24

## 2020-11-24 RX ORDER — PROMETHAZINE HYDROCHLORIDE 12.5 MG/1
12.5 TABLET ORAL EVERY 6 HOURS PRN
Status: DISCONTINUED | OUTPATIENT
Start: 2020-11-24 | End: 2020-11-24 | Stop reason: HOSPADM

## 2020-11-24 RX ADMIN — OXYTOCIN 650 ML/HR: 10 INJECTION INTRAVENOUS at 08:28

## 2020-11-24 RX ADMIN — POLYETHYLENE GLYCOL 3350 17 G: 17 POWDER, FOR SOLUTION ORAL at 20:21

## 2020-11-24 RX ADMIN — BUPIVACAINE HYDROCHLORIDE 0.8 ML: 5 INJECTION, SOLUTION EPIDURAL; INTRACAUDAL; PERINEURAL at 07:22

## 2020-11-24 RX ADMIN — OXYTOCIN 2 MILLI-UNITS/MIN: 10 INJECTION INTRAVENOUS at 04:04

## 2020-11-24 RX ADMIN — IBUPROFEN 600 MG: 600 TABLET, FILM COATED ORAL at 09:05

## 2020-11-24 RX ADMIN — FENTANYL CITRATE 100 MCG: 50 INJECTION, SOLUTION INTRAMUSCULAR; INTRAVENOUS at 07:29

## 2020-11-24 RX ADMIN — LIDOCAINE HYDROCHLORIDE AND EPINEPHRINE 3 ML: 15; 5 INJECTION, SOLUTION EPIDURAL at 07:27

## 2020-11-24 RX ADMIN — POLYETHYLENE GLYCOL 3350 17 G: 17 POWDER, FOR SOLUTION ORAL at 13:33

## 2020-11-24 RX ADMIN — PENICILLIN G 3 MILLION UNITS: 3000000 INJECTION, SOLUTION INTRAVENOUS at 02:02

## 2020-11-24 RX ADMIN — Medication 2 APPLICATION: at 10:18

## 2020-11-24 RX ADMIN — BENZOCAINE 1 APPLICATION: 5.6 OINTMENT TOPICAL at 10:17

## 2020-11-24 RX ADMIN — Medication: at 10:17

## 2020-11-24 RX ADMIN — IBUPROFEN 600 MG: 600 TABLET, FILM COATED ORAL at 23:12

## 2020-11-24 RX ADMIN — WITCH HAZEL 1 PAD: 500 SOLUTION RECTAL; TOPICAL at 10:17

## 2020-11-24 RX ADMIN — PENICILLIN G 3 MILLION UNITS: 3000000 INJECTION, SOLUTION INTRAVENOUS at 05:45

## 2020-11-24 RX ADMIN — DOCUSATE SODIUM 100 MG: 100 CAPSULE, LIQUID FILLED ORAL at 13:33

## 2020-11-24 NOTE — ANESTHESIA PREPROCEDURE EVALUATION
Anesthesia Evaluation     Patient summary reviewed and Nursing notes reviewed   NPO Solid Status: > 6 hours  NPO Liquid Status: < 2 hours           Airway   Mallampati: II  TM distance: >3 FB  Neck ROM: full  No difficulty expected  Dental      Pulmonary    (+) asthma,  Cardiovascular - negative cardio ROS        Neuro/Psych- negative ROS  GI/Hepatic/Renal/Endo - negative ROS     Musculoskeletal (-) negative ROS    Abdominal    Substance History - negative use     OB/GYN    (+) Pregnant,         Other                        Anesthesia Plan    ASA 2     CSE       Anesthetic plan, all risks, benefits, and alternatives have been provided, discussed and informed consent has been obtained with: patient.  Use of blood products discussed with patient .

## 2020-11-24 NOTE — ANESTHESIA PROCEDURE NOTES
CSE Block      Patient reassessed immediately prior to procedure    Patient location during procedure: OB  Reason for block: labor pain  Preanesthetic Checklist  Completed: patient identified, site marked, surgical consent, pre-op evaluation, timeout performed, IV checked, risks and benefits discussed and monitors and equipment checked  CSE  Patient position: sitting  Prep: ChloraPrep  Patient monitoring: blood pressure monitoring and continuous pulse oximetry  Procedures: palpation technique  Spinal Needle  Needle type: Crispin   Needle gauge: 25 G  Approach: midline  Location: L3-4  Fluid Appearance: clear    Epidural Needle  Injection technique: DAVEY air  Needle type: Tuohy   Needle gauge: 17 G  Location: L3-L4  Loss of Resistance: 4cm  Cath Depth at Skin (cm): 9  Aspiration: negative  Test dose: negative      Catheter  Catheter type: side hole  Catheter size: 20 G  Assessment  Dressing:occlusive dressing applied and secured with tape  Pt Tolerance:patient tolerated the procedure well with no apparent complications  Complications:no

## 2020-11-25 LAB
BASOPHILS # BLD AUTO: 0.05 10*3/MM3 (ref 0–0.2)
BASOPHILS NFR BLD AUTO: 0.3 % (ref 0–1.5)
DEPRECATED RDW RBC AUTO: 60.6 FL (ref 37–54)
EOSINOPHIL # BLD AUTO: 0.21 10*3/MM3 (ref 0–0.4)
EOSINOPHIL NFR BLD AUTO: 1.4 % (ref 0.3–6.2)
ERYTHROCYTE [DISTWIDTH] IN BLOOD BY AUTOMATED COUNT: 17.3 % (ref 12.3–15.4)
HCT VFR BLD AUTO: 28.8 % (ref 34–46.6)
HGB BLD-MCNC: 9.1 G/DL (ref 12–15.9)
IMM GRANULOCYTES # BLD AUTO: 0.18 10*3/MM3 (ref 0–0.05)
IMM GRANULOCYTES NFR BLD AUTO: 1.2 % (ref 0–0.5)
LYMPHOCYTES # BLD AUTO: 2.98 10*3/MM3 (ref 0.7–3.1)
LYMPHOCYTES NFR BLD AUTO: 19.7 % (ref 19.6–45.3)
MCH RBC QN AUTO: 30.2 PG (ref 26.6–33)
MCHC RBC AUTO-ENTMCNC: 31.6 G/DL (ref 31.5–35.7)
MCV RBC AUTO: 95.7 FL (ref 79–97)
MONOCYTES # BLD AUTO: 1.07 10*3/MM3 (ref 0.1–0.9)
MONOCYTES NFR BLD AUTO: 7.1 % (ref 5–12)
NEUTROPHILS NFR BLD AUTO: 10.61 10*3/MM3 (ref 1.7–7)
NEUTROPHILS NFR BLD AUTO: 70.3 % (ref 42.7–76)
NRBC BLD AUTO-RTO: 0 /100 WBC (ref 0–0.2)
PLATELET # BLD AUTO: 193 10*3/MM3 (ref 140–450)
PMV BLD AUTO: 11 FL (ref 6–12)
RBC # BLD AUTO: 3.01 10*6/MM3 (ref 3.77–5.28)
WBC # BLD AUTO: 15.1 10*3/MM3 (ref 3.4–10.8)

## 2020-11-25 PROCEDURE — 85025 COMPLETE CBC W/AUTO DIFF WBC: CPT | Performed by: OBSTETRICS & GYNECOLOGY

## 2020-11-25 PROCEDURE — 0503F POSTPARTUM CARE VISIT: CPT | Performed by: NURSE PRACTITIONER

## 2020-11-25 RX ADMIN — IBUPROFEN 600 MG: 600 TABLET, FILM COATED ORAL at 10:34

## 2020-11-25 RX ADMIN — IBUPROFEN 600 MG: 600 TABLET, FILM COATED ORAL at 22:00

## 2020-11-25 RX ADMIN — WITCH HAZEL 1 PAD: 500 SOLUTION RECTAL; TOPICAL at 12:07

## 2020-11-26 VITALS
OXYGEN SATURATION: 98 % | TEMPERATURE: 98.4 F | HEIGHT: 62 IN | SYSTOLIC BLOOD PRESSURE: 120 MMHG | RESPIRATION RATE: 20 BRPM | BODY MASS INDEX: 26.87 KG/M2 | WEIGHT: 146 LBS | HEART RATE: 70 BPM | DIASTOLIC BLOOD PRESSURE: 70 MMHG

## 2021-01-11 ENCOUNTER — POSTPARTUM VISIT (OUTPATIENT)
Dept: OBSTETRICS AND GYNECOLOGY | Facility: CLINIC | Age: 31
End: 2021-01-11

## 2021-01-11 VITALS — WEIGHT: 125 LBS | DIASTOLIC BLOOD PRESSURE: 70 MMHG | BODY MASS INDEX: 22.86 KG/M2 | SYSTOLIC BLOOD PRESSURE: 120 MMHG

## 2021-01-11 DIAGNOSIS — Z30.014 ENCOUNTER FOR INITIAL PRESCRIPTION OF INTRAUTERINE CONTRACEPTIVE DEVICE (IUD): ICD-10-CM

## 2021-01-11 PROCEDURE — 0503F POSTPARTUM CARE VISIT: CPT | Performed by: OBSTETRICS & GYNECOLOGY

## 2021-01-11 NOTE — PROGRESS NOTES
Chief Complaint   Patient presents with   • Postpartum Care     6wk       6 Week Postpartum Visit         Marian Samuels is a 30 y.o.  s/p  at 39w3d weeks on 2020, who presents today for a 6 week postpartum check.      At the time of delivery were you diagnosed with any of the following: None. The laceration was 2nd degree and is healing well.    Patient denies postpartum depression.  Patient describes bleeding as absent.  Patient is breast feeding.  Desires contraceptive methods: IUD, paraguard.  Patient denies bowel or bladder issues.    Postpartum Depression Screening Questionnaire: scored a 3, no treatment indicated.  Baby Name: Kayleigh  Baby Weight: 7-2  Baby Discharged: Discharged with Mom  Delivering Physician: [place]    Last Completed Pap Smear       Status Date      PAP SMEAR Done 2020 negative, with yeast        Is the patient due for a pap today? No    The additional following portions of the patient's history were reviewed and updated as appropriate: allergies, current medications, past family history, past medical history, past social history, past surgical history and problem list.    Review of Systems   Constitutional: Negative.    HENT: Negative.    Eyes: Negative.    Respiratory: Negative.    Cardiovascular: Negative.    Gastrointestinal: Negative.    Endocrine: Negative.    Genitourinary: Negative.    Musculoskeletal: Negative.    Skin: Negative.    Allergic/Immunologic: Negative.    Neurological: Negative.    Hematological: Negative.    Psychiatric/Behavioral: Negative.      All other systems reviewed and are negative.     I have reviewed and agree with the HPI, ROS, and historical information as entered above. Mariluz Block MD    /70   Wt 56.7 kg (125 lb)   LMP 02/10/2020   Breastfeeding Yes   BMI 22.86 kg/m²     Physical Exam  Vitals signs and nursing note reviewed. Exam conducted with a chaperone present.   Constitutional:       Appearance: She is well-developed.   HENT:       Head: Normocephalic and atraumatic.   Neck:      Musculoskeletal: Normal range of motion. No muscular tenderness.      Thyroid: No thyroid mass or thyromegaly.   Pulmonary:      Breath sounds: No rhonchi.   Abdominal:      Palpations: Abdomen is soft. Abdomen is not rigid. There is no mass.      Tenderness: There is no abdominal tenderness. There is no guarding.      Hernia: No hernia is present.   Genitourinary:     Vagina: Normal.      Cervix: Normal.      Uterus: Normal.    Neurological:      Mental Status: She is alert and oriented to person, place, and time.   Psychiatric:         Behavior: Behavior normal.             Assessment and Plan    Problem List Items Addressed This Visit     None      Visit Diagnoses     Postpartum exam    -  Primary    Encounter for initial prescription of intrauterine contraceptive device (IUD)              1. S/p Vaginal delivery, 6 weeks postpartum.  Doing well.    2. Return to normal physical activity.  No pelvic restrictions.   3. Contraception: contraceptive methods: paragard, FU insertion      Mariluz Block MD  01/11/2021

## 2021-03-11 ENCOUNTER — OFFICE VISIT (OUTPATIENT)
Dept: OBSTETRICS AND GYNECOLOGY | Facility: CLINIC | Age: 31
End: 2021-03-11

## 2021-03-11 VITALS
HEIGHT: 62 IN | WEIGHT: 121 LBS | BODY MASS INDEX: 22.26 KG/M2 | SYSTOLIC BLOOD PRESSURE: 100 MMHG | DIASTOLIC BLOOD PRESSURE: 60 MMHG

## 2021-03-11 DIAGNOSIS — Z97.5 CONTRACEPTION, DEVICE INTRAUTERINE: Primary | ICD-10-CM

## 2021-03-11 PROCEDURE — 58300 INSERT INTRAUTERINE DEVICE: CPT | Performed by: OBSTETRICS & GYNECOLOGY

## 2021-03-11 NOTE — PROGRESS NOTES
Procedure: IUD Insertion Procedure Note     Procedures    Pre procedure indication 1) Desires Mirena  Post procedure indication 1) Desires Mirena  NDC #: 79447-462-87  Lot #: [id number]   Exp Date: 6/2023  BH device    The risks, benefits, and alternatives to Mirena were explained at length with the patient. All her questions were answered and consents were signed.UPT today is negative.     The patient was placed in a dorsal lithotomy position on the examining table in [facility] A bimanual exam confirmed the uterus was normal in size. A speculum was inserted into the vagina and the cervix was brought into view.    The cervix was prepped with Betadine. The anterior lip was grasped with a single-tooth tenaculum. The endometrial cavity was then sounded to 7 cm without use of a dilator. This sealed Mirena package was opened and the IUD was removed in a sterile fashion.    The upper edge of the depth setting the flange was set at a uterine sound measured. The  was then carefully advanced to the cervical canal into the uterus to the level of the fundus.  The slider was then retracted about 1 cm and deployed the device. The device was then gently advanced to the fundus. The IUD was then released by pulling the slider down all the way. The  was removed carefully from the uterus. The threads were then cut leaving 2-3 cm visible outside of the cervix.  The single-tooth tenaculum was removed from the anterior lip. Good hemostasis was noted.     All other instruments were removed from the vagina.   There were no complications.  The patient tolerated the procedure well with a minimal amount of discomfort.    The patient was counseled about the need to return in 4 weeks with U/S for IUD check.     She was counseled about the need to use a backup method of contraception such as condoms until her post insertion exam was performed. The patient verbalized understanding that the Mirena will need to be removed/replaced  after 5 years. The patient is counseled to contact us if she has any significant or increasing bleeding, pain, fever, chills, or other concerns. She is instructed to see a doctor right away if she believes that she may be pregnant at any time with the IUD in place.    Mariluz Block MD  03/11/2021

## 2021-03-12 LAB
1,25(OH)2D SERPL-MCNC: 90 PG/ML (ref 19.9–79.3)
ERYTHROCYTE [DISTWIDTH] IN BLOOD BY AUTOMATED COUNT: 12.2 % (ref 12.3–15.4)
HCT VFR BLD AUTO: 40.4 % (ref 34–46.6)
HGB BLD-MCNC: 14.1 G/DL (ref 12–15.9)
MCH RBC QN AUTO: 31.1 PG (ref 26.6–33)
MCHC RBC AUTO-ENTMCNC: 34.9 G/DL (ref 31.5–35.7)
MCV RBC AUTO: 89.2 FL (ref 79–97)
PLATELET # BLD AUTO: 292 10*3/MM3 (ref 140–450)
RBC # BLD AUTO: 4.53 10*6/MM3 (ref 3.77–5.28)
TSH SERPL DL<=0.005 MIU/L-ACNC: 1.41 UIU/ML (ref 0.27–4.2)
WBC # BLD AUTO: 6.51 10*3/MM3 (ref 3.4–10.8)

## 2021-04-15 ENCOUNTER — OFFICE VISIT (OUTPATIENT)
Dept: OBSTETRICS AND GYNECOLOGY | Facility: CLINIC | Age: 31
End: 2021-04-15

## 2021-04-15 VITALS
DIASTOLIC BLOOD PRESSURE: 74 MMHG | SYSTOLIC BLOOD PRESSURE: 120 MMHG | HEIGHT: 62 IN | WEIGHT: 120 LBS | BODY MASS INDEX: 22.08 KG/M2

## 2021-04-15 DIAGNOSIS — Z30.431 IUD CHECK UP: Primary | ICD-10-CM

## 2021-04-15 PROBLEM — Z34.90 CURRENTLY PREGNANT: Status: RESOLVED | Noted: 2020-11-23 | Resolved: 2021-04-15

## 2021-04-15 PROBLEM — Z34.90 PREGNANCY: Status: RESOLVED | Noted: 2020-11-12 | Resolved: 2021-04-15

## 2021-04-15 PROCEDURE — 99212 OFFICE O/P EST SF 10 MIN: CPT | Performed by: OBSTETRICS & GYNECOLOGY

## 2021-04-15 NOTE — PROGRESS NOTES
"Chief Complaint   Patient presents with   • Follow-up     IUD check          Subjective   HPI  Marian Samuels is a 30 y.o. female, , who presents for IUD check follow up.  She had an IUD placed 1 month ago.  Her LMP was No LMP recorded (lmp unknown). Patient has had an implant..  Since the IUD placement, the patient has not had any unusual complaints.     The additional following portions of the patient's history were reviewed and updated as appropriate: allergies, current medications, past family history, past medical history, past social history, past surgical history and problem list.    Did the patient have u/s today? Yes.  Findings showed IUD had correct placement.  I have personally evaluated the U/S and agree with the findings. Mariluz Block MD    Review of Systems  All other systems reviewed and are negative.     I have reviewed and agree with the HPI, ROS, and historical information as entered above. Mariluz Block MD    Objective   /74   Ht 157.5 cm (62\")   Wt 54.4 kg (120 lb)   LMP  (LMP Unknown)   BMI 21.95 kg/m²     Physical Exam  Vitals and nursing note reviewed.   Constitutional:       Appearance: Normal appearance.   HENT:      Head: Normocephalic and atraumatic.   Eyes:      General: No scleral icterus.     Pupils: Pupils are equal, round, and reactive to light.   Pulmonary:      Effort: Pulmonary effort is normal.      Breath sounds: Normal breath sounds.   Abdominal:      General: Bowel sounds are normal. There is no distension.      Palpations: Abdomen is soft.      Tenderness: There is no abdominal tenderness.   Musculoskeletal:         General: Normal range of motion.      Cervical back: Normal range of motion and neck supple.      Right lower leg: No edema.      Left lower leg: No edema.   Skin:     General: Skin is warm and dry.   Neurological:      General: No focal deficit present.      Mental Status: She is alert and oriented to person, place, and time.   Psychiatric:       "   Mood and Affect: Mood normal.         Behavior: Behavior normal. Behavior is cooperative.         Assessment/Plan     Assessment     Problem List Items Addressed This Visit     None      Visit Diagnoses     IUD check up    -  Primary              Plan     1. Return to office PRN  Return for Annual physical.      Mariluz Block MD  04/15/2021

## 2022-10-31 ENCOUNTER — OFFICE VISIT (OUTPATIENT)
Dept: OBSTETRICS AND GYNECOLOGY | Facility: CLINIC | Age: 32
End: 2022-10-31

## 2022-10-31 VITALS
HEIGHT: 62 IN | WEIGHT: 118 LBS | SYSTOLIC BLOOD PRESSURE: 104 MMHG | DIASTOLIC BLOOD PRESSURE: 72 MMHG | BODY MASS INDEX: 21.71 KG/M2

## 2022-10-31 DIAGNOSIS — Z30.432 ENCOUNTER FOR REMOVAL OF INTRAUTERINE CONTRACEPTIVE DEVICE (IUD): ICD-10-CM

## 2022-10-31 DIAGNOSIS — Z86.2 HISTORY OF ANEMIA: Primary | ICD-10-CM

## 2022-10-31 PROCEDURE — 58301 REMOVE INTRAUTERINE DEVICE: CPT | Performed by: OBSTETRICS & GYNECOLOGY

## 2022-10-31 NOTE — PROGRESS NOTES
Gynecologic Procedure Note        Procedure: IUD Removal     Procedures    Pre procedure indication     1) Desires Removal of IUD    Post procedure indication   1) Desires Removal of IUD    The patient presents today for removal of her IUD.  She requests removal of her IUD due to Desires pregnancy. She plans to use no method for contraception. All her questions were answered and consents were signed.      The patient was placed in a dorsal lithotomy position on the examining table in stirrups.  A speculum was inserted into the vagina and the cervix was brought into view.  An IUD string was visualized.  The string was then grasped and removed intact with gentle traction.  There was a Minimal amount of bleeding and cramping.    All  instruments were removed from the vagina.       The patient tolerated the procedure well with a mild amount of discomfort.  She was monitored for 10 minutes prior to discharge.      There were no complications.    The patient was counseled on other options for birth control. She plans to use no method for contraception.     Problem List Items Addressed This Visit    None  Visit Diagnoses     History of anemia    -  Primary    Relevant Orders    CBC & Differential    Comprehensive Metabolic Panel    Hemoglobin A1c    Vitamin D,25-Hydroxy    TSH    Lipid Panel    Ferritin            Mariluz Block MD  10/31/2022

## 2022-11-01 LAB
25(OH)D3+25(OH)D2 SERPL-MCNC: 27.7 NG/ML (ref 30–100)
ALBUMIN SERPL-MCNC: 4.8 G/DL (ref 3.5–5.2)
ALBUMIN/GLOB SERPL: 2 G/DL
ALP SERPL-CCNC: 49 U/L (ref 39–117)
ALT SERPL-CCNC: 10 U/L (ref 1–33)
AST SERPL-CCNC: 20 U/L (ref 1–32)
BASOPHILS # BLD AUTO: 0.04 10*3/MM3 (ref 0–0.2)
BASOPHILS NFR BLD AUTO: 0.5 % (ref 0–1.5)
BILIRUB SERPL-MCNC: 0.5 MG/DL (ref 0–1.2)
BUN SERPL-MCNC: 7 MG/DL (ref 6–20)
BUN/CREAT SERPL: 10.1 (ref 7–25)
CALCIUM SERPL-MCNC: 9.6 MG/DL (ref 8.6–10.5)
CHLORIDE SERPL-SCNC: 103 MMOL/L (ref 98–107)
CHOLEST SERPL-MCNC: 174 MG/DL (ref 0–200)
CO2 SERPL-SCNC: 27 MMOL/L (ref 22–29)
CREAT SERPL-MCNC: 0.69 MG/DL (ref 0.57–1)
EGFRCR SERPLBLD CKD-EPI 2021: 118.4 ML/MIN/1.73
EOSINOPHIL # BLD AUTO: 0.27 10*3/MM3 (ref 0–0.4)
EOSINOPHIL NFR BLD AUTO: 3.1 % (ref 0.3–6.2)
ERYTHROCYTE [DISTWIDTH] IN BLOOD BY AUTOMATED COUNT: 12 % (ref 12.3–15.4)
FERRITIN SERPL-MCNC: 76.8 NG/ML (ref 13–150)
GLOBULIN SER CALC-MCNC: 2.4 GM/DL
GLUCOSE SERPL-MCNC: 89 MG/DL (ref 65–99)
HBA1C MFR BLD: 5.1 % (ref 4.8–5.6)
HCT VFR BLD AUTO: 40.8 % (ref 34–46.6)
HDLC SERPL-MCNC: 91 MG/DL (ref 40–60)
HGB BLD-MCNC: 13.8 G/DL (ref 12–15.9)
IMM GRANULOCYTES # BLD AUTO: 0.03 10*3/MM3 (ref 0–0.05)
IMM GRANULOCYTES NFR BLD AUTO: 0.3 % (ref 0–0.5)
LDLC SERPL CALC-MCNC: 72 MG/DL (ref 0–100)
LYMPHOCYTES # BLD AUTO: 2.9 10*3/MM3 (ref 0.7–3.1)
LYMPHOCYTES NFR BLD AUTO: 33.8 % (ref 19.6–45.3)
MCH RBC QN AUTO: 30.5 PG (ref 26.6–33)
MCHC RBC AUTO-ENTMCNC: 33.8 G/DL (ref 31.5–35.7)
MCV RBC AUTO: 90.3 FL (ref 79–97)
MONOCYTES # BLD AUTO: 0.5 10*3/MM3 (ref 0.1–0.9)
MONOCYTES NFR BLD AUTO: 5.8 % (ref 5–12)
NEUTROPHILS # BLD AUTO: 4.84 10*3/MM3 (ref 1.7–7)
NEUTROPHILS NFR BLD AUTO: 56.5 % (ref 42.7–76)
NRBC BLD AUTO-RTO: 0 /100 WBC (ref 0–0.2)
PLATELET # BLD AUTO: 303 10*3/MM3 (ref 140–450)
POTASSIUM SERPL-SCNC: 4.1 MMOL/L (ref 3.5–5.2)
PROT SERPL-MCNC: 7.2 G/DL (ref 6–8.5)
RBC # BLD AUTO: 4.52 10*6/MM3 (ref 3.77–5.28)
SODIUM SERPL-SCNC: 140 MMOL/L (ref 136–145)
TRIGL SERPL-MCNC: 53 MG/DL (ref 0–150)
TSH SERPL DL<=0.005 MIU/L-ACNC: 1.57 UIU/ML (ref 0.27–4.2)
VLDLC SERPL CALC-MCNC: 11 MG/DL (ref 5–40)
WBC # BLD AUTO: 8.58 10*3/MM3 (ref 3.4–10.8)

## 2023-02-06 ENCOUNTER — TELEPHONE (OUTPATIENT)
Dept: OBSTETRICS AND GYNECOLOGY | Facility: CLINIC | Age: 33
End: 2023-02-06
Payer: COMMERCIAL

## 2023-02-06 NOTE — TELEPHONE ENCOUNTER
----- Message from Marian Samuels sent at 2/6/2023 12:27 PM EST -----  Regarding: Medical advice  Contact: [phone]  Hi ,  I’m currently around 9 weeks and 4 days per my calculation. I’ve been having abdominal cramps for the last 4 days without spotting, my nausea and vomiting symptoms disappeared since yesterday. I haven’t had US (scheduled 2/14)  Should I get earlier checkup?    Thanks    Marian

## 2023-02-06 NOTE — TELEPHONE ENCOUNTER
Called to speak with pt. She state she has had menstrual like cramping for the last few days that comes and goes. She states cramping is not severe, denies bleeding. ABO is O+. Pt believes she is 9w pregnant, LMP 12/4/22 but she has very irregular cycles. She denies dysuria or hematuria. Spoke with araceli BUSTILLOS who states for pt to monitor s/s, call if pain worsens. Pt v/u.

## 2023-02-14 ENCOUNTER — INITIAL PRENATAL (OUTPATIENT)
Dept: OBSTETRICS AND GYNECOLOGY | Facility: CLINIC | Age: 33
End: 2023-02-14
Payer: COMMERCIAL

## 2023-02-14 VITALS — DIASTOLIC BLOOD PRESSURE: 66 MMHG | SYSTOLIC BLOOD PRESSURE: 106 MMHG | WEIGHT: 123.2 LBS | BODY MASS INDEX: 22.53 KG/M2

## 2023-02-14 DIAGNOSIS — Z3A.10 10 WEEKS GESTATION OF PREGNANCY: ICD-10-CM

## 2023-02-14 DIAGNOSIS — Z34.81 PRENATAL CARE, SUBSEQUENT PREGNANCY, FIRST TRIMESTER: Primary | ICD-10-CM

## 2023-02-14 PROBLEM — Z34.90 PREGNANCY: Status: ACTIVE | Noted: 2023-02-14

## 2023-02-14 PROCEDURE — 0501F PRENATAL FLOW SHEET: CPT | Performed by: OBSTETRICS & GYNECOLOGY

## 2023-02-14 NOTE — PROGRESS NOTES
Initial ob visit     CC- Here for care of pregnancy        Marian Samuels is a 32 y.o. female, , who presents for her first obstetrical visit.  Her last LMP was Patient's last menstrual period was 2022.. Her ALISHA is 2023, by Last Menstrual Period. Current GA is 10w4d.     Initial positive test date : 2023, UPT        Her periods are: irregular 30-35 days  Prior obstetric issues: none  Patient's past medical history is significant for: none.  Family history of genetic issues (includes FOB): none  Prior infections concerning in pregnancy (Rash, fever in last 2 weeks): No  Varicella Hx - history of chicken pox  Prior testing for Cystic Fibrosis Carrier or Sickle Cell Trait- none  Prepregnancy BMI - Body mass index is 22.53 kg/m².  History of STD: no  Hx of HSV for patient or partner: no  Ultrasound Today: yes    OB History    Para Term  AB Living   4 2 2   1 2   SAB IAB Ectopic Molar Multiple Live Births   1       0 2      # Outcome Date GA Lbr Jan/2nd Weight Sex Delivery Anes PTL Lv   4 Current            3 Term 20 39w3d 08:45 / 00:44 3240 g (7 lb 2.3 oz) F Vag-Spont EPI N JIE   2 Term 18 38w0d  2722 g (6 lb) M Vag-Spont EPI  JIE      Complications: Postpartum hemorrhage   1 SAB 10/01/16 6w0d    SAB          Additional Pertinent History   Last Pap : 2020 Result: negative HPV: not done     Last Completed Pap Smear          Ordered - PAP SMEAR (Every 3 Years) Ordered on 2020  Done - negative, with yeast              History of abnormal Pap smear: no  Family history of uterine, colon, breast, or ovarian cancer: no  Feelings of Anxiety or Depression: no  Tobacco Usage?: No   Alcohol/Drug Use?: NO  Over the age of 35 at delivery: no  Desires Genetic Screening: MSAFP  Flu Status: Already given in current flu season    PMH    Current Outpatient Medications:   •  Prenatal Vit-Fe Fumarate-FA (PRENATAL PO), Take  by mouth., Disp: , Rfl:      Past Medical  History:   Diagnosis Date   • Asthma    • History of postpartum hemorrhage    • History of third degree perineal laceration    • PCOS (polycystic ovarian syndrome)    • Polycystic ovary syndrome At age 16        History reviewed. No pertinent surgical history.    Review of Systems   Review of Systems  Patient Reports: Nausea and vomiting  Patient Denies: Spotting, Heavy bleeding and Cramping  All systems reviewed and otherwise normal.    I have reviewed and agree with the HPI, ROS, and historical information as entered above. Mariluz Blcok MD    /66   Wt 55.9 kg (123 lb 3.2 oz)   LMP 2022   BMI 22.53 kg/m²     The additional following portions of the patient's history were reviewed and updated as appropriate: allergies, current medications, past family history, past medical history, past social history, past surgical history and problem list.    Physical Exam  General:  well developed; well nourished  no acute distress   Chest/Respiratory: No labored breathing, normal respiratory effort, normal appearance, no respiratory noises noted   Heart:  not examined   Thyroid: not examined   Breasts:  Not performed.   Abdomen: soft, non-tender; no masses  no umbilical or inguinal hernias are present  no hepato-splenomegaly   Pelvis: Not performed.        Assessment and Plan    Problem List Items Addressed This Visit     Pregnancy    Overview     2 prev  term        Other Visit Diagnoses     Prenatal care, subsequent pregnancy, first trimester    -  Primary    Relevant Orders    Obstetric Panel    Chlamydia trachomatis, Neisseria gonorrhoeae, PCR w/ confirmation - Urine, Urine, Clean Catch    HIV-1 / O / 2 Ag / Antibody 4th Generation    Urinalysis With Microscopic - Urine, Clean Catch    Urine Culture - Urine, Urine, Clean Catch    Urine Drug Screen - Urine, Clean Catch    [id number] PLUS Core+SCA+ESS - Blood,    LIQUID-BASED PAP SMEAR, [facility] ([facility])          1. Pregnancy at 10w4d  2. Reviewed  routine prenatal care with the office and educational materials given  3. Lab(s) Ordered  4. Discussed options for genetic testing including first trimester nuchal translucency screen, genetic disease carrier testing, quadruple screen, and NIPT  5. Patient is on Prenatal vitamins  6. Activity recommendation : 150 minutes/week of moderate intensity aerobic activity unless we limit for bleeding, hypertension or other pregnancy complication   Return in about 1 month (around 3/14/2023) for F/U Prenatal.      Mariluz Block MD  02/14/2023

## 2023-02-15 LAB — REF LAB TEST METHOD: NORMAL

## 2023-02-17 LAB
ABO GROUP BLD: NORMAL
AMPHETAMINES UR QL SCN: NEGATIVE NG/ML
APPEARANCE UR: CLEAR
BACTERIA #/AREA URNS HPF: ABNORMAL /[HPF]
BACTERIA UR CULT: NO GROWTH
BACTERIA UR CULT: NORMAL
BARBITURATES UR QL SCN: NEGATIVE NG/ML
BASOPHILS # BLD AUTO: 0 X10E3/UL (ref 0–0.2)
BASOPHILS NFR BLD AUTO: 0 %
BENZODIAZ UR QL SCN: NEGATIVE NG/ML
BILIRUB UR QL STRIP: NEGATIVE
BLD GP AB SCN SERPL QL: NEGATIVE
BZE UR QL SCN: NEGATIVE NG/ML
C TRACH RRNA SPEC QL NAA+PROBE: NEGATIVE
CANNABINOIDS UR QL SCN: NEGATIVE NG/ML
CASTS URNS QL MICRO: ABNORMAL /LPF
COLOR UR: YELLOW
CREAT UR-MCNC: 54.2 MG/DL (ref 20–300)
CRYSTALS URNS MICRO: ABNORMAL
EOSINOPHIL # BLD AUTO: 0.4 X10E3/UL (ref 0–0.4)
EOSINOPHIL NFR BLD AUTO: 4 %
EPI CELLS #/AREA URNS HPF: ABNORMAL /HPF (ref 0–10)
ERYTHROCYTE [DISTWIDTH] IN BLOOD BY AUTOMATED COUNT: 12.7 % (ref 11.7–15.4)
GLUCOSE UR QL STRIP: NEGATIVE
HBV SURFACE AG SERPL QL IA: NEGATIVE
HCT VFR BLD AUTO: 35.8 % (ref 34–46.6)
HCV IGG SERPL QL IA: NON REACTIVE
HGB BLD-MCNC: 12 G/DL (ref 11.1–15.9)
HGB UR QL STRIP: NEGATIVE
HIV 1+2 AB+HIV1 P24 AG SERPL QL IA: NON REACTIVE
IMM GRANULOCYTES # BLD AUTO: 0 X10E3/UL (ref 0–0.1)
IMM GRANULOCYTES NFR BLD AUTO: 0 %
KETONES UR QL STRIP: NEGATIVE
LABORATORY COMMENT REPORT: NORMAL
LEUKOCYTE ESTERASE UR QL STRIP: NEGATIVE
LYMPHOCYTES # BLD AUTO: 2.5 X10E3/UL (ref 0.7–3.1)
LYMPHOCYTES NFR BLD AUTO: 25 %
MCH RBC QN AUTO: 29.9 PG (ref 26.6–33)
MCHC RBC AUTO-ENTMCNC: 33.5 G/DL (ref 31.5–35.7)
MCV RBC AUTO: 89 FL (ref 79–97)
METHADONE UR QL SCN: NEGATIVE NG/ML
MICRO URNS: NORMAL
MICRO URNS: NORMAL
MONOCYTES # BLD AUTO: 0.6 X10E3/UL (ref 0.1–0.9)
MONOCYTES NFR BLD AUTO: 6 %
N GONORRHOEA RRNA SPEC QL NAA+PROBE: NEGATIVE
NEUTROPHILS # BLD AUTO: 6.4 X10E3/UL (ref 1.4–7)
NEUTROPHILS NFR BLD AUTO: 65 %
NITRITE UR QL STRIP: NEGATIVE
OPIATES UR QL SCN: NEGATIVE NG/ML
OXYCODONE+OXYMORPHONE UR QL SCN: NEGATIVE NG/ML
PCP UR QL: NEGATIVE NG/ML
PH UR STRIP: 6 [PH] (ref 5–7.5)
PH UR: 5.7 [PH] (ref 4.5–8.9)
PLATELET # BLD AUTO: 312 X10E3/UL (ref 150–450)
PROPOXYPH UR QL SCN: NEGATIVE NG/ML
PROT UR QL STRIP: NEGATIVE
RBC # BLD AUTO: 4.01 X10E6/UL (ref 3.77–5.28)
RBC #/AREA URNS HPF: ABNORMAL /HPF (ref 0–2)
RH BLD: POSITIVE
RPR SER QL: NON REACTIVE
RUBV IGG SERPL IA-ACNC: 4.36 INDEX
SP GR UR STRIP: 1.02 (ref 1–1.03)
UNIDENT CRYS URNS QL MICRO: PRESENT
UROBILINOGEN UR STRIP-MCNC: 0.2 MG/DL (ref 0.2–1)
WBC # BLD AUTO: 10 X10E3/UL (ref 3.4–10.8)
WBC #/AREA URNS HPF: ABNORMAL /HPF (ref 0–5)

## 2023-03-21 ENCOUNTER — ROUTINE PRENATAL (OUTPATIENT)
Dept: OBSTETRICS AND GYNECOLOGY | Facility: CLINIC | Age: 33
End: 2023-03-21
Payer: COMMERCIAL

## 2023-03-21 VITALS — SYSTOLIC BLOOD PRESSURE: 112 MMHG | WEIGHT: 129.2 LBS | DIASTOLIC BLOOD PRESSURE: 62 MMHG | BODY MASS INDEX: 23.63 KG/M2

## 2023-03-21 DIAGNOSIS — Z34.90 PRENATAL CARE, ANTEPARTUM: Primary | ICD-10-CM

## 2023-03-21 DIAGNOSIS — Z3A.15 15 WEEKS GESTATION OF PREGNANCY: ICD-10-CM

## 2023-03-21 LAB
GLUCOSE UR STRIP-MCNC: NEGATIVE MG/DL
PROT UR STRIP-MCNC: NEGATIVE MG/DL

## 2023-03-21 PROCEDURE — 0502F SUBSEQUENT PRENATAL CARE: CPT | Performed by: OBSTETRICS & GYNECOLOGY

## 2023-03-21 NOTE — PROGRESS NOTES
OB FOLLOW UP  CC- Here for care of pregnancy        Marian Samuels is a 32 y.o.  15w4d patient being seen today for her obstetrical follow up visit. Patient reports no complaints.     Her prenatal care is complicated by (and status) :   Patient Active Problem List   Diagnosis   • Iron deficiency anemia during pregnancy   • Pregnancy       Ultrasound Today: No    AFP: desires    ROS -   Patient Denies: Loss of Fluid, Vaginal Spotting, Vision Changes, Headaches, Nausea , Vomiting , Contractions and Epigastric pain  Fetal Movement : normal  All other systems reviewed and are negative.       The additional following portions of the patient's history were reviewed and updated as appropriate: allergies and current medications.    I have reviewed and agree with the HPI, ROS, and historical information as entered above. Mariluz Block MD        EXAM:     Prenatal Vitals  BP: 112/62  Weight: 58.6 kg (129 lb 3.2 oz)   Fetal Heart Rate: +   Pelvic Exam:        Urine Glucose Read-only: Negative  Urine Protein Read-only: Negative           Assessment and Plan    Problem List Items Addressed This Visit     Pregnancy    Overview     2 prev  term  cfDNA low risk, AFP        Other Visit Diagnoses     Prenatal care, antepartum    -  Primary    Relevant Orders    POC Urinalysis Dipstick (Completed)    Alpha Fetoprotein, Maternal    US Ob 14 + Weeks Single or First Gestation          1. Pregnancy at 15w4d  2. Fetal status reassuring.   3. Counseled on MSAFP alone in relation to OTD and placental issues. - desires today   4. Anatomy scan next visit.   5. Activity and Exercise discussed.  6. Patient is on Prenatal vitamins  Return in about 1 month (around 2023) for F/U Prenatal, U/S Next Visit.    Mariluz Block MD  2023

## 2023-03-23 LAB
AFP INTERP SERPL-IMP: NORMAL
AFP INTERP SERPL-IMP: NORMAL
AFP MOM SERPL: 1.33
AFP SERPL-MCNC: 48.2 NG/ML
AGE AT DELIVERY: 32.9 YR
GA METHOD: NORMAL
GA: 15.6 WEEKS
IDDM PATIENT QL: NO
LABORATORY COMMENT REPORT: NORMAL
MULTIPLE PREGNANCY: NO
NEURAL TUBE DEFECT RISK FETUS: 4399 %
RESULT: NORMAL

## 2023-05-01 ENCOUNTER — ROUTINE PRENATAL (OUTPATIENT)
Dept: OBSTETRICS AND GYNECOLOGY | Facility: CLINIC | Age: 33
End: 2023-05-01
Payer: COMMERCIAL

## 2023-05-01 VITALS — DIASTOLIC BLOOD PRESSURE: 68 MMHG | WEIGHT: 129.8 LBS | SYSTOLIC BLOOD PRESSURE: 102 MMHG | BODY MASS INDEX: 23.74 KG/M2

## 2023-05-01 DIAGNOSIS — Z3A.21 21 WEEKS GESTATION OF PREGNANCY: ICD-10-CM

## 2023-05-01 DIAGNOSIS — Z34.92 PRENATAL CARE IN SECOND TRIMESTER: Primary | ICD-10-CM

## 2023-05-01 LAB
GLUCOSE UR STRIP-MCNC: NEGATIVE MG/DL
PROT UR STRIP-MCNC: NEGATIVE MG/DL

## 2023-05-01 NOTE — PROGRESS NOTES
OB FOLLOW UP  CC- Here for care of pregnancy        Marian Samuels is a 32 y.o.  21w3d patient being seen today for her obstetrical follow up visit. Patient reports intermittent cramping.     Her prenatal care is complicated by (and status) : None  Patient Active Problem List   Diagnosis   • Iron deficiency anemia during pregnancy   • Pregnancy       Flu Status: Already given in current flu season  Ultrasound Today: Yes    ROS -   Patient Reports : Cramping  Patient Denies: Loss of Fluid, Vaginal Spotting, Vision Changes, Headaches, Nausea  and Vomiting   Fetal Movement : normal  All other systems reviewed and are negative.       The additional following portions of the patient's history were reviewed and updated as appropriate: allergies, current medications, past family history, past medical history, past social history, past surgical history and problem list.    I have reviewed and agree with the HPI, ROS, and historical information as entered above. Mariluz Block MD    /68   Wt 58.9 kg (129 lb 12.8 oz)   LMP 2022   BMI 23.74 kg/m²       EXAM:     Prenatal Vitals  BP: 102/68  Weight: 58.9 kg (129 lb 12.8 oz)   Fetal Heart Rate: 146          Urine Glucose Read-only: Negative  Urine Protein Read-only: Negative       Assessment and Plan    Problem List Items Addressed This Visit     Pregnancy    Overview     2 prev  term  cfDNA low risk, AFP negative        Other Visit Diagnoses     Prenatal care in second trimester    -  Primary    Relevant Orders    POC Urinalysis Dipstick (Completed)    US Ob Follow Up Transabdominal Approach          1. Pregnancy at 21w3d  2. Anatomy scan today is incomplete, follow up in 4 weeks for additional views. Anatomy that was visualized was within normal limits.  3. Fetal status reassuring.   4. Activity and Exercise discussed.  5. Patient is on Prenatal vitamins  Return in about 1 month (around 2023) for F/U Prenatal, U/S Next Visit.    Mariluz Block  MD  05/01/2023

## 2023-06-01 ENCOUNTER — ROUTINE PRENATAL (OUTPATIENT)
Dept: OBSTETRICS AND GYNECOLOGY | Facility: CLINIC | Age: 33
End: 2023-06-01

## 2023-06-01 VITALS — DIASTOLIC BLOOD PRESSURE: 60 MMHG | BODY MASS INDEX: 24.51 KG/M2 | SYSTOLIC BLOOD PRESSURE: 106 MMHG | WEIGHT: 134 LBS

## 2023-06-01 DIAGNOSIS — Z3A.25 25 WEEKS GESTATION OF PREGNANCY: ICD-10-CM

## 2023-06-01 DIAGNOSIS — Z34.92 PRENATAL CARE IN SECOND TRIMESTER: Primary | ICD-10-CM

## 2023-06-01 LAB
EXPIRATION DATE: 0
GLUCOSE UR STRIP-MCNC: NEGATIVE MG/DL
Lab: 0
PROT UR STRIP-MCNC: NEGATIVE MG/DL

## 2023-06-01 NOTE — PROGRESS NOTES
OB FOLLOW UP  CC- Here for care of pregnancy        Marian Samuels is a 32 y.o.  25w6d patient being seen today for her obstetrical follow up visit. Patient reports heartburn (Tums helping). 28 week visit prep today. Faxed breast pump prescription request to [facility].    Her prenatal care is complicated by (and status):   Patient Active Problem List   Diagnosis   • Iron deficiency anemia during pregnancy   • Pregnancy       Flu Status: not currently flu season  Ultrasound Today: Yes    ROS -   Patient Reports : see above  Patient Denies: Loss of Fluid, Vaginal Spotting, Vision Changes, Headaches, Nausea , Vomiting  and Contractions  Fetal Movement : normal  All other systems reviewed and are negative.       The additional following portions of the patient's history were reviewed and updated as appropriate: allergies, current medications, past family history, past medical history, past social history, past surgical history and problem list.      I have reviewed and agree with the HPI, ROS, and historical information as entered above. Mariluz Block MD      /60   Wt 60.8 kg (134 lb)   LMP 2022   BMI 24.51 kg/m²       EXAM:     Prenatal Vitals  BP: 106/60  Weight: 60.8 kg (134 lb)   Fetal Heart Rate: 151 US               Urine Glucose Read-only: Negative  Urine Protein Read-only: Negative       Assessment and Plan    Problem List Items Addressed This Visit     Pregnancy    Overview     2 prev  term  cfDNA low risk, AFP negative         Relevant Orders    POC Glucose, Urine, Qualitative, Dipstick (Completed)    POC Protein, Urine, Qualitative, Dipstick (Completed)   Other Visit Diagnoses     Prenatal care in second trimester    -  Primary    Relevant Orders    POC Glucose, Urine, Qualitative, Dipstick (Completed)    POC Protein, Urine, Qualitative, Dipstick (Completed)          1. Pregnancy at 25w6d  2. Fetal status reassuring.  3. anatomy scan completed today and within normal limits.  4. 1  hour gtt, CBC, Antibody screen and TDAP next visit. Instructions given  5. Discussed/encouraged TDAP vaccination after 28 weeks  6. Activity and Exercise discussed.  Return in about 3 weeks (around 6/22/2023) for F/U Prenatal, and glucola.    Mariluz Block MD  06/01/2023

## 2023-07-24 ENCOUNTER — ROUTINE PRENATAL (OUTPATIENT)
Dept: OBSTETRICS AND GYNECOLOGY | Facility: CLINIC | Age: 33
End: 2023-07-24
Payer: COMMERCIAL

## 2023-07-24 VITALS — DIASTOLIC BLOOD PRESSURE: 60 MMHG | WEIGHT: 142 LBS | BODY MASS INDEX: 25.97 KG/M2 | SYSTOLIC BLOOD PRESSURE: 110 MMHG

## 2023-07-24 DIAGNOSIS — Z34.83 ENCOUNTER FOR SUPERVISION OF OTHER NORMAL PREGNANCY IN THIRD TRIMESTER: Primary | ICD-10-CM

## 2023-07-24 DIAGNOSIS — Z3A.33 33 WEEKS GESTATION OF PREGNANCY: ICD-10-CM

## 2023-07-24 LAB
GLUCOSE UR STRIP-MCNC: NEGATIVE MG/DL
PROT UR STRIP-MCNC: NEGATIVE MG/DL

## 2023-07-24 PROCEDURE — 0502F SUBSEQUENT PRENATAL CARE: CPT | Performed by: OBSTETRICS & GYNECOLOGY

## 2023-07-24 RX ORDER — CHLORHEXIDINE GLUCONATE 0.12 MG/ML
RINSE ORAL
COMMUNITY
Start: 2023-07-15

## 2023-07-24 NOTE — PROGRESS NOTES
OB FOLLOW UP  CC- Here for care of pregnancy        Marian Samuels is a 32 y.o.  33w3d patient being seen today for her obstetrical follow up visit. Patient reports pink tinged discharge this morning (had IC within the past 48h).  She c/o increased pelvic pressure.  She plans to start taking an iron supplement and use a pregnancy belt.      Her prenatal care is complicated by (and status) :    Patient Active Problem List   Diagnosis    Iron deficiency anemia during pregnancy    Pregnancy       TDAP status: received at last visit  Rhogam status: was not indicated  28 week labs: Reviewed and Labs show anemia. She is not taking additional iron supplement.   Ultrasound Today: Yes; EFW 37th%, AC 33rd%  Non Stress Test: No.      ROS -     Patient Denies: Loss of Fluid, Vision Changes, Headaches, Nausea , Vomiting , Contractions, and Epigastric pain  Fetal Movement : normal  All other systems reviewed and are negative.       The additional following portions of the patient's history were reviewed and updated as appropriate: allergies, current medications, past family history, past medical history, past social history, past surgical history, and problem list.    I have reviewed and agree with the HPI, ROS, and historical information as entered above. Mariluz Block MD      /60   Wt 64.4 kg (142 lb)   LMP 2022   BMI 25.97 kg/m²         EXAM:     Prenatal Vitals  BP: 110/60  Weight: 64.4 kg (142 lb)   Fetal Heart Rate: 131               Urine Glucose Read-only: Negative  Urine Protein Read-only: Negative           Assessment and Plan    Problem List Items Addressed This Visit       Pregnancy    Overview     2 prev  term  cfDNA low risk, AFP negative  EFW 37%ile 33 wks          Other Visit Diagnoses       Encounter for supervision of other normal pregnancy in third trimester    -  Primary    Relevant Orders    POC Urinalysis Dipstick (Completed)            Pregnancy at 33w3d.normal growth and  fluid on US today  Fetal status reassuring.  28 week labs reviewed.    Activity and Exercise discussed.  Fetal movement/PTL or Labor precautions  Return in about 2 weeks (around 8/7/2023) for F/U Prenatal.    Mariluz Block MD  07/24/2023

## 2023-08-09 ENCOUNTER — TELEPHONE (OUTPATIENT)
Dept: OBSTETRICS AND GYNECOLOGY | Facility: CLINIC | Age: 33
End: 2023-08-09
Payer: COMMERCIAL

## 2023-08-09 NOTE — TELEPHONE ENCOUNTER
Pt is 35w5d and states that she was having contractions every 6-7 minutes yesterday but today they have lessened to one every 2 hours. She also reports she had some pelvic pain this morning but it has went away with drinking water. She reports good fetal movement and denies any bleeding, cramping, or LOF.     I let her know she is ok to wait until her appt tomorrow but if she starts having regular contractions again or LOF she should go to L&D. She vU

## 2023-08-09 NOTE — TELEPHONE ENCOUNTER
Patient think she is sharyn and its been going on since yesterday. Every 6-7 minutes than they are on and off.

## 2023-08-10 ENCOUNTER — ROUTINE PRENATAL (OUTPATIENT)
Dept: OBSTETRICS AND GYNECOLOGY | Facility: CLINIC | Age: 33
End: 2023-08-10
Payer: COMMERCIAL

## 2023-08-10 ENCOUNTER — LAB (OUTPATIENT)
Dept: LAB | Facility: HOSPITAL | Age: 33
End: 2023-08-10
Payer: COMMERCIAL

## 2023-08-10 VITALS — BODY MASS INDEX: 26.85 KG/M2 | WEIGHT: 146.8 LBS | SYSTOLIC BLOOD PRESSURE: 100 MMHG | DIASTOLIC BLOOD PRESSURE: 62 MMHG

## 2023-08-10 DIAGNOSIS — Z34.83 PRENATAL CARE, SUBSEQUENT PREGNANCY, THIRD TRIMESTER: Primary | ICD-10-CM

## 2023-08-10 DIAGNOSIS — Z34.83 ENCOUNTER FOR SUPERVISION OF OTHER NORMAL PREGNANCY IN THIRD TRIMESTER: Primary | ICD-10-CM

## 2023-08-10 LAB
GLUCOSE UR STRIP-MCNC: NEGATIVE MG/DL
PROT UR STRIP-MCNC: NEGATIVE MG/DL

## 2023-08-10 PROCEDURE — 87081 CULTURE SCREEN ONLY: CPT

## 2023-08-10 NOTE — PROGRESS NOTES
OB FOLLOW UP  CC- Here for care of pregnancy        Marian Samuels is a 32 y.o.  35w6d patient being seen today for her obstetrical follow up visit. Patient reports frequent contractions two days ago that resolved.      Her prenatal care is complicated by (and status) :   Patient Active Problem List   Diagnosis    Iron deficiency anemia during pregnancy    Pregnancy       GBS Status: Done Today. She is not allergic to PCN.    No Known Allergies       Her Delivery Plan is: Desires IOL at 39wks. Needs to be scheduled    US today: no  Non Stress Test: No.    ROS -   Patient Denies: Loss of Fluid, Vaginal Spotting, Vision Changes, Headaches, Nausea , Vomiting , and Epigastric pain  Fetal Movement : normal  All other systems reviewed and are negative.       The additional following portions of the patient's history were reviewed and updated as appropriate: allergies, current medications, past family history, past medical history, past social history, past surgical history, and problem list.    I have reviewed and agree with the HPI, ROS, and historical information as entered above. TRESSA Harris        EXAM:     Prenatal Vitals  BP: 100/62  Weight: 66.6 kg (146 lb 12.8 oz)   Fetal Heart Rate: 150   Fundal Height (cm): 34 cm          Urine Glucose Read-only: Negative  Urine Protein Read-only: Negative           Assessment and Plan    Problem List Items Addressed This Visit    None  Visit Diagnoses       Encounter for supervision of other normal pregnancy in third trimester    -  Primary    Relevant Orders    POC Urinalysis Dipstick (Completed)    Group B Streptococcus Culture - Swab, Vaginal/Rectum            Pregnancy at 35w6d  Fetal status reassuring.   Reviewed Pre-eclampsia signs/symptoms  Discussed IOL options with patient. Pt. desires IOL at 39 weeks.   Signs of labor reviewed  Kick counts reviewed  Activity and Exercise discussed.  Return in about 1 week (around 2023) for Umair NIELSON.    Ellen  TRESSA Hernandez  08/10/2023

## 2023-08-12 LAB — BACTERIA SPEC AEROBE CULT: ABNORMAL

## 2023-08-17 ENCOUNTER — ROUTINE PRENATAL (OUTPATIENT)
Dept: OBSTETRICS AND GYNECOLOGY | Facility: CLINIC | Age: 33
End: 2023-08-17
Payer: COMMERCIAL

## 2023-08-17 VITALS — WEIGHT: 145.6 LBS | BODY MASS INDEX: 26.63 KG/M2 | DIASTOLIC BLOOD PRESSURE: 68 MMHG | SYSTOLIC BLOOD PRESSURE: 110 MMHG

## 2023-08-17 DIAGNOSIS — Z3A.36 36 WEEKS GESTATION OF PREGNANCY: Primary | ICD-10-CM

## 2023-08-17 PROCEDURE — 0502F SUBSEQUENT PRENATAL CARE: CPT | Performed by: OBSTETRICS & GYNECOLOGY

## 2023-08-17 NOTE — PROGRESS NOTES
OB FOLLOW UP  CC- Here for care of pregnancy        Marian Samuels is a 32 y.o.  36w6d patient being seen today for her obstetrical follow up visit. Patient reports irregular contractions.     Her prenatal care is complicated by (and status) :   Patient Active Problem List   Diagnosis    Iron deficiency anemia during pregnancy    Pregnancy       GBS Status: was already done and is positive.    No Known Allergies         Her Delivery Plan is: Desires IOL at 39wks. Scheduled    US today: no  Non Stress Test: No.    ROS -   Patient Denies: Loss of Fluid, Vaginal Spotting, Vision Changes, Headaches, Nausea , Vomiting , and Epigastric pain  Fetal Movement : normal  All other systems reviewed and are negative.       The additional following portions of the patient's history were reviewed and updated as appropriate: allergies, current medications, past family history, past medical history, past social history, past surgical history, and problem list.    I have reviewed and agree with the HPI, ROS, and historical information as entered above. Mariluz Block MD        EXAM:     Prenatal Vitals  BP: 110/68  Weight: 66 kg (145 lb 9.6 oz)   Fetal Heart Rate: +                          Assessment and Plan    Problem List Items Addressed This Visit       Pregnancy - Primary    Overview     2 prev  term  cfDNA low risk, AFP negative  EFW 37%ile 33 wks            Pregnancy at 36w6d  Fetal status reassuring.   Reviewed Pre-eclampsia signs/symptoms  Delivery options reviewed with patient  Signs of labor reviewed  Kick counts reviewed  Activity and Exercise discussed.  Return in about 1 week (around 2023) for F/U Prenatal.    Mariluz Block MD  2023

## 2023-08-24 ENCOUNTER — ROUTINE PRENATAL (OUTPATIENT)
Dept: OBSTETRICS AND GYNECOLOGY | Facility: CLINIC | Age: 33
End: 2023-08-24
Payer: COMMERCIAL

## 2023-08-24 VITALS — SYSTOLIC BLOOD PRESSURE: 116 MMHG | DIASTOLIC BLOOD PRESSURE: 64 MMHG | WEIGHT: 145.8 LBS | BODY MASS INDEX: 26.67 KG/M2

## 2023-08-24 DIAGNOSIS — Z3A.37 37 WEEKS GESTATION OF PREGNANCY: ICD-10-CM

## 2023-08-24 DIAGNOSIS — Z34.93 PRENATAL CARE IN THIRD TRIMESTER: Primary | ICD-10-CM

## 2023-08-24 LAB
EXPIRATION DATE: 0
GLUCOSE UR STRIP-MCNC: NEGATIVE MG/DL
Lab: 0
PROT UR STRIP-MCNC: NEGATIVE MG/DL

## 2023-08-24 NOTE — PROGRESS NOTES
OB FOLLOW UP  CC- Here for care of pregnancy        Marian Samuels is a 32 y.o.  37w6d patient being seen today for her obstetrical follow up visit. Patient reports daily heartburn (Tums helps) and daily [place] (1-2x daily; more intense with work). Requesting cervical check today.    Her prenatal care is complicated by (and status) :   Patient Active Problem List   Diagnosis    Iron deficiency anemia during pregnancy    Pregnancy       GBS Status:   Group B Strep Culture   Date Value Ref Range Status   08/10/2023 Streptococcus agalactiae (Group B) (A)  Final     Comment:       This organism is considered to be universally susceptible to penicillin.  No further antibiotic testing will be performed. If Clindamycin or Erythromycin is the drug of choice, notify the laboratory within 7 days to request susceptibility testing.         No Known Allergies       Flu Status:  not currently flu season  Her Delivery Plan is:  IOL  at 9pm     US today: no  Non Stress Test: No.    ROS -   Patient Reports :  see above  Patient Denies: Loss of Fluid, Vaginal Spotting, Vision Changes, Headaches, Nausea , and Vomiting   Fetal Movement : normal  All other systems reviewed and are negative.       The additional following portions of the patient's history were reviewed and updated as appropriate: allergies, current medications, past family history, past medical history, past social history, past surgical history, and problem list.    I have reviewed and agree with the HPI, ROS, and historical information as entered above. Mariluz Block MD        EXAM:     Prenatal Vitals  BP: 116/64  Weight: 66.1 kg (145 lb 12.8 oz)   Fetal Heart Rate: +       Dilation/Effacement/Station  Dilation: 1  Effacement (%): 60      Urine Glucose Read-only: Negative  Urine Protein Read-only: Negative           Assessment and Plan    Problem List Items Addressed This Visit       Pregnancy    Overview     2 prev  term  cfDNA low risk, AFP  negative  EFW 37%ile 33 wks          Other Visit Diagnoses       Prenatal care in third trimester    -  Primary    Relevant Orders    POC Glucose, Urine, Qualitative, Dipstick (Completed)    POC Protein, Urine, Qualitative, Dipstick (Completed)            Pregnancy at 37w6d  Fetal status reassuring.   Reviewed Pre-eclampsia signs/symptoms  Discussed IOL options with patient. Pt. desires IOL at 39 weeks.   Delivery options reviewed with patient  Signs of labor reviewed  Kick counts reviewed  Activity and Exercise discussed.  Return in about 1 week (around 8/31/2023) for F/U Prenatal.    Mariluz Block MD  08/24/2023

## 2023-08-31 ENCOUNTER — ROUTINE PRENATAL (OUTPATIENT)
Dept: OBSTETRICS AND GYNECOLOGY | Facility: CLINIC | Age: 33
End: 2023-08-31
Payer: COMMERCIAL

## 2023-08-31 VITALS — WEIGHT: 145.6 LBS | DIASTOLIC BLOOD PRESSURE: 82 MMHG | SYSTOLIC BLOOD PRESSURE: 110 MMHG | BODY MASS INDEX: 26.63 KG/M2

## 2023-08-31 DIAGNOSIS — Z34.93 PRENATAL CARE IN THIRD TRIMESTER: Primary | ICD-10-CM

## 2023-08-31 LAB
CLARITY, POC: CLEAR
COLOR UR: NORMAL
GLUCOSE UR STRIP-MCNC: NEGATIVE MG/DL
PROT UR STRIP-MCNC: NEGATIVE MG/DL

## 2023-08-31 NOTE — PROGRESS NOTES
OB FOLLOW UP  CC- Here for care of pregnancy        Marian Samuels is a 32 y.o.  38w6d patient being seen today for her obstetrical follow up visit. Patient reports heartburn relieved by Tums and irregular [place] contractions.     Her prenatal care is complicated by (and status) :   Patient Active Problem List   Diagnosis    Iron deficiency anemia during pregnancy    Pregnancy       GBS Status:   Group B Strep Culture   Date Value Ref Range Status   08/10/2023 Streptococcus agalactiae (Group B) (A)  Final     Comment:       This organism is considered to be universally susceptible to penicillin.  No further antibiotic testing will be performed. If Clindamycin or Erythromycin is the drug of choice, notify the laboratory within 7 days to request susceptibility testing.         No Known Allergies       Her Delivery Plan is: Desires IOL at 39wks. Scheduled  23 @ 2100. Pt is wondering if any spots are available on 23  US today: no  Non Stress Test: No.    ROS -   Patient Denies: Loss of Fluid, Vaginal Spotting, Vision Changes, Headaches, Nausea , Vomiting , and Epigastric pain  Fetal Movement : normal  All other systems reviewed and are negative.       The additional following portions of the patient's history were reviewed and updated as appropriate: allergies, current medications, past family history, past medical history, past social history, past surgical history, and problem list.    I have reviewed and agree with the HPI, ROS, and historical information as entered above. Mariluz Blcok MD        EXAM:     Prenatal Vitals  BP: 110/82  Weight: 66 kg (145 lb 9.6 oz)   Fetal Heart Rate: +       Dilation/Effacement/Station  Dilation: 2  Effacement (%): 70      Urine Glucose Read-only: Negative  Urine Protein Read-only: Negative           Assessment and Plan    Problem List Items Addressed This Visit    None  Visit Diagnoses       Prenatal care in third trimester    -  Primary    Relevant  Orders    POC Urinalysis Dipstick (Completed)            Pregnancy at 38w6d  Fetal status reassuring.   Reviewed Pre-eclampsia signs/symptoms  Reviewed upcoming IOL with patient. Instructions given.  Delivery options reviewed with patient  Signs of labor reviewed  Kick counts reviewed  Activity and Exercise discussed.  No follow-ups on file.    Mariluz Block MD  08/31/2023

## 2023-09-01 ENCOUNTER — PREP FOR SURGERY (OUTPATIENT)
Dept: OTHER | Facility: HOSPITAL | Age: 33
End: 2023-09-01
Payer: COMMERCIAL

## 2023-09-01 DIAGNOSIS — Z3A.39 39 WEEKS GESTATION OF PREGNANCY: Primary | ICD-10-CM

## 2023-09-01 RX ORDER — SODIUM CHLORIDE 0.9 % (FLUSH) 0.9 %
10 SYRINGE (ML) INJECTION EVERY 12 HOURS SCHEDULED
OUTPATIENT
Start: 2023-09-01

## 2023-09-01 RX ORDER — PROMETHAZINE HYDROCHLORIDE 12.5 MG/1
12.5 SUPPOSITORY RECTAL EVERY 6 HOURS PRN
OUTPATIENT
Start: 2023-09-01

## 2023-09-01 RX ORDER — ACETAMINOPHEN 325 MG/1
650 TABLET ORAL EVERY 4 HOURS PRN
OUTPATIENT
Start: 2023-09-01

## 2023-09-01 RX ORDER — LIDOCAINE HYDROCHLORIDE 10 MG/ML
0.5 INJECTION, SOLUTION EPIDURAL; INFILTRATION; INTRACAUDAL; PERINEURAL ONCE AS NEEDED
OUTPATIENT
Start: 2023-09-01

## 2023-09-01 RX ORDER — SODIUM CHLORIDE 0.9 % (FLUSH) 0.9 %
10 SYRINGE (ML) INJECTION AS NEEDED
OUTPATIENT
Start: 2023-09-01

## 2023-09-01 RX ORDER — MAGNESIUM CARB/ALUMINUM HYDROX 105-160MG
30 TABLET,CHEWABLE ORAL ONCE
OUTPATIENT
Start: 2023-09-01 | End: 2023-09-01

## 2023-09-01 RX ORDER — CARBOPROST TROMETHAMINE 250 UG/ML
250 INJECTION, SOLUTION INTRAMUSCULAR AS NEEDED
OUTPATIENT
Start: 2023-09-01

## 2023-09-01 RX ORDER — MISOPROSTOL 200 UG/1
800 TABLET ORAL AS NEEDED
OUTPATIENT
Start: 2023-09-01

## 2023-09-01 RX ORDER — OXYTOCIN/0.9 % SODIUM CHLORIDE 30/500 ML
2-30 PLASTIC BAG, INJECTION (ML) INTRAVENOUS
OUTPATIENT
Start: 2023-09-02 | End: 2025-01-13

## 2023-09-01 RX ORDER — METHYLERGONOVINE MALEATE 0.2 MG/ML
200 INJECTION INTRAVENOUS ONCE AS NEEDED
OUTPATIENT
Start: 2023-09-01

## 2023-09-01 RX ORDER — SODIUM CHLORIDE, SODIUM LACTATE, POTASSIUM CHLORIDE, CALCIUM CHLORIDE 600; 310; 30; 20 MG/100ML; MG/100ML; MG/100ML; MG/100ML
125 INJECTION, SOLUTION INTRAVENOUS CONTINUOUS
OUTPATIENT
Start: 2023-09-01

## 2023-09-01 RX ORDER — OXYCODONE AND ACETAMINOPHEN 7.5; 325 MG/1; MG/1
2 TABLET ORAL EVERY 4 HOURS PRN
OUTPATIENT
Start: 2023-09-01 | End: 2023-09-11

## 2023-09-01 RX ORDER — PROMETHAZINE HYDROCHLORIDE 12.5 MG/1
12.5 TABLET ORAL EVERY 6 HOURS PRN
OUTPATIENT
Start: 2023-09-01

## 2023-09-01 RX ORDER — MORPHINE SULFATE 2 MG/ML
2 INJECTION, SOLUTION INTRAMUSCULAR; INTRAVENOUS
OUTPATIENT
Start: 2023-09-01 | End: 2023-09-11

## 2023-09-01 RX ORDER — SODIUM CHLORIDE 9 MG/ML
40 INJECTION, SOLUTION INTRAVENOUS AS NEEDED
OUTPATIENT
Start: 2023-09-01

## 2023-09-06 ENCOUNTER — HOSPITAL ENCOUNTER (OUTPATIENT)
Facility: HOSPITAL | Age: 33
Discharge: HOME | End: 2023-09-07
Attending: OBSTETRICS & GYNECOLOGY | Admitting: OBSTETRICS & GYNECOLOGY
Payer: COMMERCIAL

## 2023-09-06 ENCOUNTER — HOSPITAL ENCOUNTER (OUTPATIENT)
Dept: LABOR AND DELIVERY | Facility: HOSPITAL | Age: 33
Discharge: HOME | End: 2023-09-06
Payer: COMMERCIAL

## 2023-09-06 VITALS
SYSTOLIC BLOOD PRESSURE: 115 MMHG | DIASTOLIC BLOOD PRESSURE: 61 MMHG | HEART RATE: 78 BPM | WEIGHT: 146 LBS | RESPIRATION RATE: 16 BRPM | TEMPERATURE: 98.2 F | BODY MASS INDEX: 26.7 KG/M2

## 2023-09-06 PROCEDURE — G0463 HOSPITAL OUTPT CLINIC VISIT: HCPCS

## 2023-09-07 ENCOUNTER — HOSPITAL ENCOUNTER (INPATIENT)
Facility: HOSPITAL | Age: 33
LOS: 2 days | Discharge: HOME | End: 2023-09-09
Attending: OBSTETRICS & GYNECOLOGY | Admitting: OBSTETRICS & GYNECOLOGY
Payer: COMMERCIAL

## 2023-09-07 ENCOUNTER — ANESTHESIA (OUTPATIENT)
Dept: LABOR AND DELIVERY | Facility: HOSPITAL | Age: 33
End: 2023-09-07
Payer: COMMERCIAL

## 2023-09-07 ENCOUNTER — ANESTHESIA EVENT (OUTPATIENT)
Dept: LABOR AND DELIVERY | Facility: HOSPITAL | Age: 33
End: 2023-09-07
Payer: COMMERCIAL

## 2023-09-07 DIAGNOSIS — Z3A.39 39 WEEKS GESTATION OF PREGNANCY: ICD-10-CM

## 2023-09-07 PROBLEM — Z34.90 CURRENTLY PREGNANT: Status: ACTIVE | Noted: 2023-09-07

## 2023-09-07 LAB
ABO GROUP BLD: NORMAL
BLD GP AB SCN SERPL QL: NEGATIVE
DEPRECATED RDW RBC AUTO: 42.9 FL (ref 37–54)
ERYTHROCYTE [DISTWIDTH] IN BLOOD BY AUTOMATED COUNT: 13.2 % (ref 12.3–15.4)
HCT VFR BLD AUTO: 32.8 % (ref 34–46.6)
HGB BLD-MCNC: 11 G/DL (ref 12–15.9)
MCH RBC QN AUTO: 30.2 PG (ref 26.6–33)
MCHC RBC AUTO-ENTMCNC: 33.5 G/DL (ref 31.5–35.7)
MCV RBC AUTO: 90.1 FL (ref 79–97)
PLATELET # BLD AUTO: 251 10*3/MM3 (ref 140–450)
PMV BLD AUTO: 10.9 FL (ref 6–12)
RBC # BLD AUTO: 3.64 10*6/MM3 (ref 3.77–5.28)
RH BLD: POSITIVE
T&S EXPIRATION DATE: NORMAL
WBC NRBC COR # BLD: 10.14 10*3/MM3 (ref 3.4–10.8)

## 2023-09-07 PROCEDURE — 59400 OBSTETRICAL CARE: CPT | Performed by: OBSTETRICS & GYNECOLOGY

## 2023-09-07 PROCEDURE — 25010000002 ROPIVACAINE PER 1 MG: Performed by: ANESTHESIOLOGY

## 2023-09-07 PROCEDURE — 85027 COMPLETE CBC AUTOMATED: CPT | Performed by: OBSTETRICS & GYNECOLOGY

## 2023-09-07 PROCEDURE — C1755 CATHETER, INTRASPINAL: HCPCS

## 2023-09-07 PROCEDURE — 86901 BLOOD TYPING SEROLOGIC RH(D): CPT | Performed by: OBSTETRICS & GYNECOLOGY

## 2023-09-07 PROCEDURE — 99213 OFFICE O/P EST LOW 20 MIN: CPT | Performed by: OBSTETRICS & GYNECOLOGY

## 2023-09-07 PROCEDURE — 59025 FETAL NON-STRESS TEST: CPT

## 2023-09-07 PROCEDURE — S0260 H&P FOR SURGERY: HCPCS | Performed by: OBSTETRICS & GYNECOLOGY

## 2023-09-07 PROCEDURE — 59025 FETAL NON-STRESS TEST: CPT | Performed by: OBSTETRICS & GYNECOLOGY

## 2023-09-07 PROCEDURE — C1755 CATHETER, INTRASPINAL: HCPCS | Performed by: ANESTHESIOLOGY

## 2023-09-07 PROCEDURE — 25010000002 BUPIVACAINE (PF) 0.25 % SOLUTION: Performed by: ANESTHESIOLOGY

## 2023-09-07 PROCEDURE — 25010000002 PENICILLIN G POTASSIUM PER 600000 UNITS: Performed by: OBSTETRICS & GYNECOLOGY

## 2023-09-07 PROCEDURE — 10907ZC DRAINAGE OF AMNIOTIC FLUID, THERAPEUTIC FROM PRODUCTS OF CONCEPTION, VIA NATURAL OR ARTIFICIAL OPENING: ICD-10-PCS | Performed by: OBSTETRICS & GYNECOLOGY

## 2023-09-07 PROCEDURE — 86900 BLOOD TYPING SEROLOGIC ABO: CPT | Performed by: OBSTETRICS & GYNECOLOGY

## 2023-09-07 PROCEDURE — 25010000002 FENTANYL CITRATE (PF) 50 MCG/ML SOLUTION: Performed by: ANESTHESIOLOGY

## 2023-09-07 PROCEDURE — 3E033VJ INTRODUCTION OF OTHER HORMONE INTO PERIPHERAL VEIN, PERCUTANEOUS APPROACH: ICD-10-PCS | Performed by: OBSTETRICS & GYNECOLOGY

## 2023-09-07 PROCEDURE — 86850 RBC ANTIBODY SCREEN: CPT | Performed by: OBSTETRICS & GYNECOLOGY

## 2023-09-07 PROCEDURE — 0KQM0ZZ REPAIR PERINEUM MUSCLE, OPEN APPROACH: ICD-10-PCS | Performed by: OBSTETRICS & GYNECOLOGY

## 2023-09-07 RX ORDER — ROPIVACAINE HYDROCHLORIDE 2 MG/ML
15 INJECTION, SOLUTION EPIDURAL; INFILTRATION; PERINEURAL CONTINUOUS
Status: DISCONTINUED | OUTPATIENT
Start: 2023-09-07 | End: 2023-09-07

## 2023-09-07 RX ORDER — OXYCODONE AND ACETAMINOPHEN 7.5; 325 MG/1; MG/1
2 TABLET ORAL EVERY 4 HOURS PRN
Status: DISCONTINUED | OUTPATIENT
Start: 2023-09-07 | End: 2023-09-07

## 2023-09-07 RX ORDER — MAGNESIUM CARB/ALUMINUM HYDROX 105-160MG
30 TABLET,CHEWABLE ORAL ONCE
Status: DISCONTINUED | OUTPATIENT
Start: 2023-09-07 | End: 2023-09-07

## 2023-09-07 RX ORDER — HYDROCODONE BITARTRATE AND ACETAMINOPHEN 10; 325 MG/1; MG/1
1 TABLET ORAL EVERY 4 HOURS PRN
Status: DISCONTINUED | OUTPATIENT
Start: 2023-09-07 | End: 2023-09-09 | Stop reason: HOSPADM

## 2023-09-07 RX ORDER — PENICILLIN G 3000000 [IU]/50ML
3 INJECTION, SOLUTION INTRAVENOUS EVERY 4 HOURS
Status: DISCONTINUED | OUTPATIENT
Start: 2023-09-07 | End: 2023-09-07

## 2023-09-07 RX ORDER — SODIUM CHLORIDE, SODIUM LACTATE, POTASSIUM CHLORIDE, CALCIUM CHLORIDE 600; 310; 30; 20 MG/100ML; MG/100ML; MG/100ML; MG/100ML
125 INJECTION, SOLUTION INTRAVENOUS CONTINUOUS
Status: DISCONTINUED | OUTPATIENT
Start: 2023-09-07 | End: 2023-09-07

## 2023-09-07 RX ORDER — ONDANSETRON 2 MG/ML
4 INJECTION INTRAMUSCULAR; INTRAVENOUS EVERY 6 HOURS PRN
Status: DISCONTINUED | OUTPATIENT
Start: 2023-09-07 | End: 2023-09-09 | Stop reason: HOSPADM

## 2023-09-07 RX ORDER — SODIUM CHLORIDE 0.9 % (FLUSH) 0.9 %
10 SYRINGE (ML) INJECTION EVERY 12 HOURS SCHEDULED
Status: DISCONTINUED | OUTPATIENT
Start: 2023-09-07 | End: 2023-09-07

## 2023-09-07 RX ORDER — MISOPROSTOL 200 UG/1
800 TABLET ORAL AS NEEDED
Status: DISCONTINUED | OUTPATIENT
Start: 2023-09-07 | End: 2023-09-07

## 2023-09-07 RX ORDER — BUPIVACAINE HYDROCHLORIDE 2.5 MG/ML
INJECTION, SOLUTION EPIDURAL; INFILTRATION; INTRACAUDAL AS NEEDED
Status: DISCONTINUED | OUTPATIENT
Start: 2023-09-07 | End: 2023-09-07 | Stop reason: SURG

## 2023-09-07 RX ORDER — FENTANYL CITRATE 50 UG/ML
INJECTION, SOLUTION INTRAMUSCULAR; INTRAVENOUS AS NEEDED
Status: DISCONTINUED | OUTPATIENT
Start: 2023-09-07 | End: 2023-09-07 | Stop reason: SURG

## 2023-09-07 RX ORDER — HYDROCODONE BITARTRATE AND ACETAMINOPHEN 5; 325 MG/1; MG/1
1 TABLET ORAL EVERY 4 HOURS PRN
Status: DISCONTINUED | OUTPATIENT
Start: 2023-09-07 | End: 2023-09-09 | Stop reason: HOSPADM

## 2023-09-07 RX ORDER — SODIUM CHLORIDE 9 MG/ML
40 INJECTION, SOLUTION INTRAVENOUS AS NEEDED
Status: DISCONTINUED | OUTPATIENT
Start: 2023-09-07 | End: 2023-09-07

## 2023-09-07 RX ORDER — MISOPROSTOL 200 UG/1
800 TABLET ORAL AS NEEDED
Status: DISCONTINUED | OUTPATIENT
Start: 2023-09-07 | End: 2023-09-09 | Stop reason: HOSPADM

## 2023-09-07 RX ORDER — DOCUSATE SODIUM 100 MG/1
100 CAPSULE, LIQUID FILLED ORAL 2 TIMES DAILY
Status: DISCONTINUED | OUTPATIENT
Start: 2023-09-07 | End: 2023-09-09 | Stop reason: HOSPADM

## 2023-09-07 RX ORDER — CITRIC ACID/SODIUM CITRATE 334-500MG
30 SOLUTION, ORAL ORAL ONCE
Status: DISCONTINUED | OUTPATIENT
Start: 2023-09-07 | End: 2023-09-07

## 2023-09-07 RX ORDER — ACETAMINOPHEN 325 MG/1
650 TABLET ORAL EVERY 4 HOURS PRN
Status: DISCONTINUED | OUTPATIENT
Start: 2023-09-07 | End: 2023-09-07

## 2023-09-07 RX ORDER — EPHEDRINE SULFATE 5 MG/ML
10 INJECTION INTRAVENOUS
Status: DISCONTINUED | OUTPATIENT
Start: 2023-09-07 | End: 2023-09-07

## 2023-09-07 RX ORDER — IBUPROFEN 600 MG/1
600 TABLET ORAL EVERY 6 HOURS PRN
Status: DISCONTINUED | OUTPATIENT
Start: 2023-09-07 | End: 2023-09-09 | Stop reason: HOSPADM

## 2023-09-07 RX ORDER — PROMETHAZINE HYDROCHLORIDE 12.5 MG/1
12.5 TABLET ORAL EVERY 6 HOURS PRN
Status: DISCONTINUED | OUTPATIENT
Start: 2023-09-07 | End: 2023-09-07

## 2023-09-07 RX ORDER — FAMOTIDINE 10 MG/ML
20 INJECTION, SOLUTION INTRAVENOUS ONCE AS NEEDED
Status: DISCONTINUED | OUTPATIENT
Start: 2023-09-07 | End: 2023-09-07

## 2023-09-07 RX ORDER — MORPHINE SULFATE 2 MG/ML
2 INJECTION, SOLUTION INTRAMUSCULAR; INTRAVENOUS
Status: DISCONTINUED | OUTPATIENT
Start: 2023-09-07 | End: 2023-09-07

## 2023-09-07 RX ORDER — BISACODYL 10 MG
10 SUPPOSITORY, RECTAL RECTAL DAILY PRN
Status: DISCONTINUED | OUTPATIENT
Start: 2023-09-08 | End: 2023-09-09 | Stop reason: HOSPADM

## 2023-09-07 RX ORDER — ONDANSETRON 4 MG/1
4 TABLET, FILM COATED ORAL EVERY 6 HOURS PRN
Status: DISCONTINUED | OUTPATIENT
Start: 2023-09-07 | End: 2023-09-09 | Stop reason: HOSPADM

## 2023-09-07 RX ORDER — SODIUM CHLORIDE 0.9 % (FLUSH) 0.9 %
1-10 SYRINGE (ML) INJECTION AS NEEDED
Status: DISCONTINUED | OUTPATIENT
Start: 2023-09-07 | End: 2023-09-09 | Stop reason: HOSPADM

## 2023-09-07 RX ORDER — CARBOPROST TROMETHAMINE 250 UG/ML
250 INJECTION, SOLUTION INTRAMUSCULAR AS NEEDED
Status: DISCONTINUED | OUTPATIENT
Start: 2023-09-07 | End: 2023-09-07

## 2023-09-07 RX ORDER — LIDOCAINE HYDROCHLORIDE 10 MG/ML
0.5 INJECTION, SOLUTION EPIDURAL; INFILTRATION; INTRACAUDAL; PERINEURAL ONCE AS NEEDED
Status: DISCONTINUED | OUTPATIENT
Start: 2023-09-07 | End: 2023-09-07

## 2023-09-07 RX ORDER — LIDOCAINE HYDROCHLORIDE AND EPINEPHRINE 15; 5 MG/ML; UG/ML
INJECTION, SOLUTION EPIDURAL AS NEEDED
Status: DISCONTINUED | OUTPATIENT
Start: 2023-09-07 | End: 2023-09-07 | Stop reason: SURG

## 2023-09-07 RX ORDER — PROMETHAZINE HYDROCHLORIDE 12.5 MG/1
12.5 TABLET ORAL EVERY 4 HOURS PRN
Status: DISCONTINUED | OUTPATIENT
Start: 2023-09-07 | End: 2023-09-09 | Stop reason: HOSPADM

## 2023-09-07 RX ORDER — PROMETHAZINE HYDROCHLORIDE 12.5 MG/1
12.5 SUPPOSITORY RECTAL EVERY 6 HOURS PRN
Status: DISCONTINUED | OUTPATIENT
Start: 2023-09-07 | End: 2023-09-07

## 2023-09-07 RX ORDER — ONDANSETRON 2 MG/ML
4 INJECTION INTRAMUSCULAR; INTRAVENOUS ONCE AS NEEDED
Status: DISCONTINUED | OUTPATIENT
Start: 2023-09-07 | End: 2023-09-07

## 2023-09-07 RX ORDER — METHYLERGONOVINE MALEATE 0.2 MG/ML
200 INJECTION INTRAVENOUS ONCE AS NEEDED
Status: DISCONTINUED | OUTPATIENT
Start: 2023-09-07 | End: 2023-09-07

## 2023-09-07 RX ORDER — OXYTOCIN/0.9 % SODIUM CHLORIDE 30/500 ML
2-30 PLASTIC BAG, INJECTION (ML) INTRAVENOUS
Status: DISCONTINUED | OUTPATIENT
Start: 2023-09-07 | End: 2023-09-07

## 2023-09-07 RX ORDER — SODIUM CHLORIDE 0.9 % (FLUSH) 0.9 %
10 SYRINGE (ML) INJECTION AS NEEDED
Status: DISCONTINUED | OUTPATIENT
Start: 2023-09-07 | End: 2023-09-07

## 2023-09-07 RX ORDER — HYDROCORTISONE 25 MG/G
1 CREAM TOPICAL AS NEEDED
Status: DISCONTINUED | OUTPATIENT
Start: 2023-09-07 | End: 2023-09-09 | Stop reason: HOSPADM

## 2023-09-07 RX ORDER — ACETAMINOPHEN 325 MG/1
650 TABLET ORAL EVERY 6 HOURS PRN
Status: DISCONTINUED | OUTPATIENT
Start: 2023-09-07 | End: 2023-09-09 | Stop reason: HOSPADM

## 2023-09-07 RX ORDER — DIPHENHYDRAMINE HYDROCHLORIDE 50 MG/ML
12.5 INJECTION INTRAMUSCULAR; INTRAVENOUS EVERY 8 HOURS PRN
Status: DISCONTINUED | OUTPATIENT
Start: 2023-09-07 | End: 2023-09-07

## 2023-09-07 RX ADMIN — BENZOCAINE 1 APPLICATION: 5.6 OINTMENT TOPICAL at 20:12

## 2023-09-07 RX ADMIN — WITCH HAZEL: 500 SOLUTION RECTAL; TOPICAL at 20:12

## 2023-09-07 RX ADMIN — SODIUM CHLORIDE, POTASSIUM CHLORIDE, SODIUM LACTATE AND CALCIUM CHLORIDE 125 ML/HR: 600; 310; 30; 20 INJECTION, SOLUTION INTRAVENOUS at 10:45

## 2023-09-07 RX ADMIN — SODIUM CHLORIDE, POTASSIUM CHLORIDE, SODIUM LACTATE AND CALCIUM CHLORIDE 1000 ML: 600; 310; 30; 20 INJECTION, SOLUTION INTRAVENOUS at 13:42

## 2023-09-07 RX ADMIN — FENTANYL CITRATE 100 MCG: 50 INJECTION, SOLUTION INTRAMUSCULAR; INTRAVENOUS at 14:29

## 2023-09-07 RX ADMIN — EPHEDRINE SULFATE 10 MG: 5 INJECTION INTRAVENOUS at 15:15

## 2023-09-07 RX ADMIN — Medication 2 MILLI-UNITS/MIN: at 11:38

## 2023-09-07 RX ADMIN — Medication: at 20:12

## 2023-09-07 RX ADMIN — BUPIVACAINE HYDROCHLORIDE 10 ML: 2.5 INJECTION, SOLUTION EPIDURAL; INFILTRATION; INTRACAUDAL; PERINEURAL at 14:30

## 2023-09-07 RX ADMIN — DOCUSATE SODIUM 100 MG: 100 CAPSULE, LIQUID FILLED ORAL at 20:12

## 2023-09-07 RX ADMIN — LIDOCAINE HYDROCHLORIDE AND EPINEPHRINE 2 ML: 15; 5 INJECTION, SOLUTION EPIDURAL at 14:28

## 2023-09-07 RX ADMIN — SODIUM CHLORIDE, POTASSIUM CHLORIDE, SODIUM LACTATE AND CALCIUM CHLORIDE 125 ML/HR: 600; 310; 30; 20 INJECTION, SOLUTION INTRAVENOUS at 14:58

## 2023-09-07 RX ADMIN — LIDOCAINE HYDROCHLORIDE AND EPINEPHRINE 3 ML: 15; 5 INJECTION, SOLUTION EPIDURAL at 14:27

## 2023-09-07 RX ADMIN — PENICILLIN G 3 MILLION UNITS: 3000000 INJECTION, SOLUTION INTRAVENOUS at 15:40

## 2023-09-07 RX ADMIN — SODIUM CHLORIDE 5 MILLION UNITS: 900 INJECTION INTRAVENOUS at 11:24

## 2023-09-07 RX ADMIN — EPHEDRINE SULFATE 10 MG: 5 INJECTION INTRAVENOUS at 15:43

## 2023-09-07 RX ADMIN — ROPIVACAINE HYDROCHLORIDE 14 ML/HR: 2 INJECTION, SOLUTION EPIDURAL; INFILTRATION; PERINEURAL at 14:34

## 2023-09-07 NOTE — NURSING NOTE
D/C instructions reviewed with pt. Pt DEANNA and has no questions. Ambulated off unit in stable condition accompanied by .

## 2023-09-07 NOTE — ANESTHESIA PROCEDURE NOTES
Labor Epidural      Patient reassessed immediately prior to procedure    Patient location during procedure: OB  Performed By  Anesthesiologist: Alexy Guillermo DO  Preanesthetic Checklist  Completed: patient identified, IV checked, site marked, risks and benefits discussed, surgical consent, monitors and equipment checked, pre-op evaluation and timeout performed  Prep:  Pt Position:sitting  Sterile Tech:gloves, mask, sterile barrier and cap  Prep:chlorhexidine gluconate and isopropyl alcohol  Monitoring:blood pressure monitoring and continuous pulse oximetry  Epidural Block Procedure:  Approach:midline  Guidance:landmark technique and palpation technique  Location:L3-L4  Needle Type:Tuohy  Needle Gauge:17 G  Loss of Resistance Medium: air  Loss of Resistance: 5cm  Cath Depth at skin:11 cm  Paresthesia: none  Aspiration:negative  Test Dose:negative  Number of Attempts: 1  Post Assessment:  Dressing:secured with tape and occlusive dressing applied (Tegaderm Placed)  Pt Tolerance:patient tolerated the procedure well with no apparent complications  Complications:no

## 2023-09-07 NOTE — H&P
[facility]  Obstetric History and Physical    Chief Complaint   Patient presents with    Scheduled Induction       HPI:      Patient is a 32 y.o. female  currently at 39w6d, who presents with some contractions.  She has had 6 mild contractions in the last 2 hours.  She denies vaginal leaking and bleeding.  +FM.    She is scheduled for an elective IOL tonight, but with the census we have had to call her off for now.          The following portions of the patients history were reviewed and updated as appropriate: current medications, allergies, past medical history, past surgical history, past family history, past social history and problem list .       Prenatal Information:   Prenatal Labs  Lab Results   Component Value Date    HEPBSAG Negative 2023    ABO O 2023    RH Positive 2023    ABSCRN Negative 2023    HEPCVIRUSABY Non Reactive 2023         External Prenatal Results       Pregnancy Outside Results - Transcribed From Office Records - See Scanned Records For Details       Test Value Date Time    ABO  O  23 1532    Rh  Positive  23 1532    Antibody Screen  Negative  23 1141       Negative  23 1532    Varicella IgG       Rubella  4.36 index 23 1532    Hgb  11.5 g/dL 23 1141       12.0 g/dL 23 1532    Hct  33.9 % 23 1141       35.8 % 23 1532    Glucose Fasting GTT       Glucose Tolerance Test 1 hour       Glucose Tolerance Test 3 hour       Gonorrhea (discrete)  Negative  23 1532    Chlamydia (discrete)  Negative  23 1532    RPR  Non Reactive  23 1532    VDRL       Syphilis Antibody       HBsAg  Negative  23 1532    Herpes Simplex Virus PCR       Herpes Simplex VIrus Culture       HIV  Non Reactive  23 1532    Hep C RNA Quant PCR       Hep C Antibody  Non Reactive  23 1532    AFP  48.2 ng/mL 23 1419    Group B Strep  Streptococcus agalactiae (Group B)  08/10/23 1854    GBS  Susceptibility to Clindamycin       GBS Susceptibility to Erythromycin       Fetal Fibronectin       Genetic Testing, Maternal Blood                 Drug Screening       Test Value Date Time    Urine Drug Screen       Amphetamine Screen  Negative ng/mL 23 1532    Barbiturate Screen  Negative ng/mL 23 1532    Benzodiazepine Screen  Negative ng/mL 23 1532    Methadone Screen  Negative ng/mL 23 1532    Phencyclidine Screen  Negative ng/mL 23 1532    Opiates Screen       THC Screen       Cocaine Screen       Propoxyphene Screen  Negative ng/mL 23 1532    Buprenorphine Screen       Methamphetamine Screen       Oxycodone Screen       Tricyclic Antidepressants Screen                 Legend    ^: Historical                              Past OB History:       OB History    Para Term  AB Living   4 2 2 0 1 2   SAB IAB Ectopic Molar Multiple Live Births   1 0 0 0 0 2      # Outcome Date GA Lbr Jan/2nd Weight Sex Delivery Anes PTL Lv   4 Current            3 Term 20 39w3d 08:45 / 00:44 3240 g (7 lb 2.3 oz) F Vag-Spont EPI N JIE      Name: ADELE VASQUEZ      Apgar1: 8  Apgar5: 9   2 Term 18 38w0d  2722 g (6 lb) M Vag-Spont EPI  JIE      Complications: Postpartum hemorrhage   1 SAB 10/01/16 6w0d    SAB          Past Medical History: Past Medical History:   Diagnosis Date    Asthma     History of postpartum hemorrhage     History of third degree perineal laceration     PCOS (polycystic ovarian syndrome)     dx at age 16      Past Surgical History History reviewed. No pertinent surgical history.   Family History: Family History   Problem Relation Age of Onset    Hypertension Father     Hypertension Mother     Diabetes type II Maternal Grandmother     Diabetes type II Maternal Grandfather     Heart attack Maternal Uncle     Breast cancer Neg Hx     Colon cancer Neg Hx     Uterine cancer Neg Hx     Ovarian cancer Neg Hx       Social History:  reports that she has never  smoked. She has never used smokeless tobacco.   reports no history of alcohol use.   reports no history of drug use.        General ROS: Denies fever, HA, shortness of air, muscle weakness, and rashes    Objective       Vital Signs Range for the last 24 hours  Temperature: Temp:  [98.2 °F (36.8 °C)] 98.2 °F (36.8 °C)   Temp Source: Temp src: Oral   BP: BP: (115)/(61) 115/61   Pulse: Heart Rate:  [78] 78   Respirations: Resp:  [16] 16   SPO2:     O2 Amount (l/min):     O2 Devices     Weight: Weight:  [66.2 kg (146 lb)] 66.2 kg (146 lb)     Physical Examination: Alert and oriented X 3  Chest - clear to auscultation.  Heart - normal rate  Abdomen - no rebound tenderness noted  bowel sounds normal  Gravid uterus, No RUQ pain, No guarding  Extremities - Non-tender bilaterally        Cervix: Exam by:  R.NCristel   Dilation:  1-2   Effacement:  60   Station:  -2       Fetal Heart Rate Assessment   Method:     Beats/min:     Baseline:  130s   Varibility:  mod   Accels:  y   Decels:  n   Tracing Category:  1     Uterine Assessment   Method:     Frequency (min):   6 contractions in 1 1/2 hour    Ctx Count in 10 min:     Duration:     Intensity:  mild   Intensity by IUPC:     Resting Tone:     Resting Tone by IUPC:     Krista Units:       brent Verma  at 39w6d with an ALISHA of 2023, by Last Menstrual Period,  Non stress test.    Indication False labour  Start time: 2340  End time 0015  15 x 15 accelerations x 2  Yes  No decelerations  Baseline   130s  Reactive NST  Yes            Assessment:  1.  Intrauterine pregnancy at 39w6d weeks gestation with reactive fetal status.    2.  False labour not ruptured  3.  Scheduled for IOL    Plan:  1. FHTs  Reactive NST  2. No cervical change or signs and symptoms of SROM or labour  3. Reviewed labour guidelines  informed to await call to be brought in for IOL  4. All questions have been answered.  5. Discharge home with routine OB follow-up      Tadeo Valera  MD  9/7/2023  00:26 EDT

## 2023-09-07 NOTE — PROCEDURES
"Patient admitted for elective induction of labor at 39 weeks 6 days gestation.  Received request for placement of transcervical Ambrose bulb for cervical ripening.  Cervical examination: 1-2 cm / 50%/-2 station (cervix mid position and medium texture).  Cephalic presentation confirmed by bedside ultrasound.  Transcervical Ambrose placed per physician request.  FHT's class 1.  Patient tolerated well.  24 Welsh, 60ml.    Sacha Swain \"Felicity\" KENDALL Galdamez MD  9/7/2023  11:57 EDT      "

## 2023-09-07 NOTE — ANESTHESIA PREPROCEDURE EVALUATION
Anesthesia Evaluation     Patient summary reviewed and Nursing notes reviewed                Airway   Mallampati: I  TM distance: >3 FB  Neck ROM: full  No difficulty expected  Dental      Pulmonary    (+) asthma,  Cardiovascular - negative cardio ROS        Neuro/Psych- negative ROS  GI/Hepatic/Renal/Endo - negative ROS     Musculoskeletal (-) negative ROS    Abdominal    Substance History - negative use     OB/GYN    (+) Pregnant        Other                      Anesthesia Plan    ASA 2     epidural       Anesthetic plan, risks, benefits, and alternatives have been provided, discussed and informed consent has been obtained with: patient.    Use of blood products discussed with patient .      CODE STATUS:

## 2023-09-07 NOTE — L&D DELIVERY NOTE
2023    Patient:Marian Samuels    MR#:[id number]    Vaginal Delivery Note  32 y.o. yo female  at 39w6d      Currently pregnant       Delivery     Delivery: Vaginal, Spontaneous     YOB: 2023    Time of Birth: 5:24 PM      Anesthesia: Epidural     Delivering clinician: Mariluz Block    Forceps?   No   Vacuum? No    Shoulder dystocia present: No     Pt progressed to complete, pushed a short time with great effort and delivered without difficulty. Baby vigorous and crying.      Infant    Findings: child  infant     Infant observations: Weight: 2850 g (6 lb 4.5 oz)     Observations/Comments:        Apgars: 8  @ 1 minute /    9  @ 5 minutes         Placenta, Cord, and Fluid    Placenta delivered  Spontaneous  at  2023  5:28 PM     Cord: 3 vessels  present.   Nuchal Cord?  yes; Number of nuchal loops present: 1     Cord blood obtained: Yes    Cord gases obtained:  No    Cord gas results: Pending         Repair    Episiotomy: No   Lacerations: Yes  Laceration Information  Laceration Repaired?   Perineal: 2nd  Yes    Periurethral:       Labial:       Sulcus:       Vaginal:       Cervical:         Suture used for repair: 2-0 Vicryl   Estimated Blood Loss:   150mls.         Complications  none    Disposition  Mother to Mother Baby/Postpartum  in stable condition currently.  Baby to remains with mom  in stable condition currently.                Mariluz Block MD  23  17:42 EDT

## 2023-09-07 NOTE — H&P
History and Physical:    Subjective     Chief Complaint   Patient presents with    Scheduled Induction       Marian Samuels is a 32 y.o. year old  with an Estimated Date of Delivery: 23 currently at 39w6d presenting with no complaints.    Prenatal care has been with Mariluz Block MD.       Review of Systems  Pertinent items are noted in HPI.     Past Medical History:   Diagnosis Date    Asthma     History of postpartum hemorrhage     History of third degree perineal laceration     PCOS (polycystic ovarian syndrome)      History reviewed. No pertinent surgical history.  Family History   Problem Relation Age of Onset    Hypertension Father     Hypertension Mother     Diabetes type II Maternal Grandmother     Diabetes type II Maternal Grandfather     Heart attack Maternal Uncle     Breast cancer Neg Hx     Colon cancer Neg Hx     Uterine cancer Neg Hx     Ovarian cancer Neg Hx      Social History     Tobacco Use    Smoking status: Never    Smokeless tobacco: Never   Vaping Use    Vaping Use: Never used   Substance Use Topics    Alcohol use: Never    Drug use: Never     Medications Prior to Admission   Medication Sig Dispense Refill Last Dose    chlorhexidine (PERIDEX) 0.12 % solution    2023    Prenatal Vit-Fe Fumarate-FA (PRENATAL PO) Take  by mouth.   2023     Allergies:  Patient has no known allergies.  OB History    Para Term  AB Living   4 2 2   1 2   SAB IAB Ectopic Molar Multiple Live Births   1       0 2      # Outcome Date GA Lbr Jan/2nd Weight Sex Delivery Anes PTL Lv   4 Current            3 Term 20 39w3d 08:45 / 00:44 3240 g (7 lb 2.3 oz) F Vag-Spont EPI N JIE   2 Term 18 38w0d  2722 g (6 lb) M Vag-Spont EPI  JIE      Complications: Postpartum hemorrhage   1 SAB 10/01/16 6w0d    SAB            Objective     /86 (BP Location: Right arm, Patient Position: Lying)   Pulse 94   Temp 98.1 °F (36.7 °C) (Oral)   Resp 16   LMP 2022     Physical  Exam    General:  No acute distress           Abdomen: Gravid, nontender       FHT's: reactive    Cervix: 1-2/50   [place]: Contraction are irreg     Lab Review   External Prenatal Results       Pregnancy Outside Results - Transcribed From Office Records - See Scanned Records For Details       Test Value Date Time    ABO  O  09/07/23 1048    Rh  Positive  09/07/23 1048    Antibody Screen  Negative  09/07/23 1048       Negative  06/22/23 1141       Negative  02/14/23 1532    Varicella IgG       Rubella  4.36 index 02/14/23 1532    Hgb  11.0 g/dL 09/07/23 1048       11.5 g/dL 06/22/23 1141       12.0 g/dL 02/14/23 1532    Hct  32.8 % 09/07/23 1048       33.9 % 06/22/23 1141       35.8 % 02/14/23 1532    Glucose Fasting GTT       Glucose Tolerance Test 1 hour       Glucose Tolerance Test 3 hour       Gonorrhea (discrete)  Negative  02/14/23 1532    Chlamydia (discrete)  Negative  02/14/23 1532    RPR  Non Reactive  02/14/23 1532    VDRL       Syphilis Antibody       HBsAg  Negative  02/14/23 1532    Herpes Simplex Virus PCR       Herpes Simplex VIrus Culture       HIV  Non Reactive  02/14/23 1532    Hep C RNA Quant PCR       Hep C Antibody  Non Reactive  02/14/23 1532    AFP  48.2 ng/mL 03/21/23 1419    Group B Strep  Streptococcus agalactiae (Group B)  08/10/23 1854    GBS Susceptibility to Clindamycin       GBS Susceptibility to Erythromycin       Fetal Fibronectin       Genetic Testing, Maternal Blood                 Drug Screening       Test Value Date Time    Urine Drug Screen       Amphetamine Screen  Negative ng/mL 02/14/23 1532    Barbiturate Screen  Negative ng/mL 02/14/23 1532    Benzodiazepine Screen  Negative ng/mL 02/14/23 1532    Methadone Screen  Negative ng/mL 02/14/23 1532    Phencyclidine Screen  Negative ng/mL 02/14/23 1532    Opiates Screen       THC Screen       Cocaine Screen       Propoxyphene Screen  Negative ng/mL 02/14/23 1532    Buprenorphine Screen       Methamphetamine Screen       Oxycodone  Screen       Tricyclic Antidepressants Screen                 Legend    ^: Historical                                Assessment & Plan     ASSESSMENT  IUP at 39w6d  induction of labor   3. GBBSpositive  PLAN  Admit to labor and delivery   2.  pitocin, baugh bulb, and antibiotics for GBBS prophylaxis         Mariluz Block MD  9/7/2023@

## 2023-09-08 LAB
BASOPHILS # BLD AUTO: 0.06 10*3/MM3 (ref 0–0.2)
BASOPHILS NFR BLD AUTO: 0.4 % (ref 0–1.5)
DEPRECATED RDW RBC AUTO: 44.7 FL (ref 37–54)
EOSINOPHIL # BLD AUTO: 0.15 10*3/MM3 (ref 0–0.4)
EOSINOPHIL NFR BLD AUTO: 1 % (ref 0.3–6.2)
ERYTHROCYTE [DISTWIDTH] IN BLOOD BY AUTOMATED COUNT: 13.2 % (ref 12.3–15.4)
HCT VFR BLD AUTO: 30.6 % (ref 34–46.6)
HGB BLD-MCNC: 10 G/DL (ref 12–15.9)
IMM GRANULOCYTES # BLD AUTO: 0.15 10*3/MM3 (ref 0–0.05)
IMM GRANULOCYTES NFR BLD AUTO: 1 % (ref 0–0.5)
LYMPHOCYTES # BLD AUTO: 2.56 10*3/MM3 (ref 0.7–3.1)
LYMPHOCYTES NFR BLD AUTO: 17.1 % (ref 19.6–45.3)
MCH RBC QN AUTO: 30.2 PG (ref 26.6–33)
MCHC RBC AUTO-ENTMCNC: 32.7 G/DL (ref 31.5–35.7)
MCV RBC AUTO: 92.4 FL (ref 79–97)
MONOCYTES # BLD AUTO: 0.92 10*3/MM3 (ref 0.1–0.9)
MONOCYTES NFR BLD AUTO: 6.1 % (ref 5–12)
NEUTROPHILS NFR BLD AUTO: 11.14 10*3/MM3 (ref 1.7–7)
NEUTROPHILS NFR BLD AUTO: 74.4 % (ref 42.7–76)
NRBC BLD AUTO-RTO: 0 /100 WBC (ref 0–0.2)
PLATELET # BLD AUTO: 207 10*3/MM3 (ref 140–450)
PMV BLD AUTO: 11.3 FL (ref 6–12)
RBC # BLD AUTO: 3.31 10*6/MM3 (ref 3.77–5.28)
WBC NRBC COR # BLD: 14.98 10*3/MM3 (ref 3.4–10.8)

## 2023-09-08 PROCEDURE — 85025 COMPLETE CBC W/AUTO DIFF WBC: CPT | Performed by: OBSTETRICS & GYNECOLOGY

## 2023-09-08 RX ADMIN — DOCUSATE SODIUM 100 MG: 100 CAPSULE, LIQUID FILLED ORAL at 20:35

## 2023-09-08 RX ADMIN — DOCUSATE SODIUM 100 MG: 100 CAPSULE, LIQUID FILLED ORAL at 08:59

## 2023-09-08 NOTE — PROGRESS NOTES
9/8/2023  PPD #1    Subjective   Marian feels well.  Patient describes her lochia less than menses.  Pain is well controlled       Objective   Temp: Temp:  [97.2 °F (36.2 °C)-98.1 °F (36.7 °C)] 98.1 °F (36.7 °C) Temp src: Oral   BP: BP: ()/(48-86) 109/58        Pulse: Heart Rate:  [] 73  RR: Resp:  [16-20] 18    General:  No acute distress   Abdomen: Fundus firm and beneath umbilicus   Pelvis: deferred     Lab Results   Component Value Date    WBC 14.98 (H) 09/08/2023    HGB 10.0 (L) 09/08/2023    HCT 30.6 (L) 09/08/2023    MCV 92.4 09/08/2023     09/08/2023    HEPBSAG Negative 02/14/2023       Assessment  PPD# 1 after vaginal delivery; doing well  Hemodynamically stable    Plan  Routine postpartum care.      This note has been electronically signed.    Monisha Pak, APRN  September 8, 2023

## 2023-09-08 NOTE — LACTATION NOTE
"   09/08/23 1244   Maternal Information   Date of Referral 09/08/23   Person Making Referral lactation consultant   Maternal Reason for Referral   (courtesy visit for new delivery. Hx: BF 1st child for 9mos, 2nd child for 12mos. Mother states this baby \"latched right away\")   Maternal Infant Feeding   Maternal Emotional State independent;receptive  (Pt encouraged to call ouf for latch check with next feeding.)   Latch Assistance none needed  (Pt very independent.)   Milk Expression/Equipment   Breast Pump Type double electric, personal  (Holt pump at home. Mother states she had a very good supply.)   Breast Pumping   Breast Pumping Interventions   (Encouraged pt to pump for short or missed feedings.)       "

## 2023-09-09 VITALS
RESPIRATION RATE: 16 BRPM | DIASTOLIC BLOOD PRESSURE: 70 MMHG | HEART RATE: 70 BPM | TEMPERATURE: 97.8 F | SYSTOLIC BLOOD PRESSURE: 114 MMHG | OXYGEN SATURATION: 99 %

## 2023-09-09 PROCEDURE — 0503F POSTPARTUM CARE VISIT: CPT | Performed by: NURSE PRACTITIONER

## 2023-09-09 RX ADMIN — DOCUSATE SODIUM 100 MG: 100 CAPSULE, LIQUID FILLED ORAL at 08:50

## 2023-09-09 NOTE — DISCHARGE SUMMARY
Discharge Summary    Date of Admission: 2023  Date of Discharge:  2023      Patient: Marian Samuels      MR#:[id number]    Delivery Provider: Mariluz Block     Attending Provider: Mariluz Block MD    Presenting Problem/History of Present Illness  Currently pregnant [Z34.90]       Currently pregnant         Discharge Diagnosis: Vaginal delivery at 39w6d    Procedures:  Vaginal, Spontaneous     2023    5:24 PM        Discharge Date: 2023;     Hospital Course  Patient is a 32 y.o. female  at 39w6d status post vaginal delivery without complication. Postpartum the patient did well. She remained afebrile, with vital signs stable. She was ready for discharge on postpartum day 2.     Infant:   female  fetus 2850 g (6 lb 4.5 oz)  with Apgar scores of 8 , 9  at five minutes.    Condition on Discharge:  Stable    Vital Signs  Temp:  [97.8 °F (36.6 °C)-98.1 °F (36.7 °C)] 97.8 °F (36.6 °C)  Heart Rate:  [70-72] 70  Resp:  [16-18] 16  BP: (102-121)/(56-74) 114/70    Lab Results   Component Value Date    WBC 14.98 (H) 2023    HGB 10.0 (L) 2023    HCT 30.6 (L) 2023    MCV 92.4 2023     2023       Discharge Disposition  Home or Self Care    Discharge Medications     Discharge Medications        Continue These Medications        Instructions Start Date   PRENATAL PO   Oral             Stop These Medications      chlorhexidine 0.12 % solution  Commonly known as: PERIDEX              Discharge Diet:     Activity at Discharge:   Activity Instructions       Sexual Activity Restrictions      Type of Restriction: Sex    Explain Sexual Activity Restrictions: Pelvic rest for 6 weeks    Work Restrictions      Type of Restriction: Work    May Return to Work: Other    Return to Work Instructions: May return to work after 6 weeks            Follow-up Appointments  No future appointments.  Additional Instructions for the Follow-ups that You Need to Schedule       Call MD With  Problems / Concerns   As directed      Instructions: Call for temp >/= 100.4, severe pain, severe bleeding, headache that does not improve with rest, medication, blurred vision, or postpartum depression. Monitor incision for signs of infection including, foul odor, discharge or separation of the wound.  Call for blood clots larger than a golf ball or saturating a pad in less than an hour.    Order Comments: Instructions: Call for temp >/= 100.4, severe pain, severe bleeding, headache that does not improve with rest, medication, blurred vision, or postpartum depression. Monitor incision for signs of infection including, foul odor, discharge or separation of the wound.  Call for blood clots larger than a golf ball or saturating a pad in less than an hour.         Discharge Follow-up with Specified Provider: Dr. Block; 6 Weeks   As directed      To: Dr. Block   Follow Up: 6 Weeks                Irving Meyer, TRESSA  09/09/23  10:30 EDT  Csd

## 2023-10-17 ENCOUNTER — POSTPARTUM VISIT (OUTPATIENT)
Dept: OBSTETRICS AND GYNECOLOGY | Facility: CLINIC | Age: 33
End: 2023-10-17
Payer: COMMERCIAL

## 2023-10-17 VITALS
HEIGHT: 62 IN | BODY MASS INDEX: 24.48 KG/M2 | DIASTOLIC BLOOD PRESSURE: 80 MMHG | SYSTOLIC BLOOD PRESSURE: 114 MMHG | WEIGHT: 133 LBS

## 2023-10-17 DIAGNOSIS — Z30.011 ENCOUNTER FOR INITIAL PRESCRIPTION OF CONTRACEPTIVE PILLS: ICD-10-CM

## 2023-10-17 RX ORDER — MELATONIN
1000 DAILY
COMMUNITY

## 2023-10-17 RX ORDER — LEVONORGESTREL AND ETHINYL ESTRADIOL 0.1-0.02MG
1 KIT ORAL DAILY
Qty: 84 TABLET | Refills: 3 | Status: SHIPPED | OUTPATIENT
Start: 2023-10-17 | End: 2024-10-16

## 2023-10-17 NOTE — PROGRESS NOTES
"        Chief Complaint   Patient presents with    Postpartum Care     6 wks        Postpartum Visit         Marian Samuels is a 33 y.o.  who presents today for a 6 week(s) postpartum check.         Vaginal, Spontaneous    Information for the patient's :  Rosemary Brown [[id number]]   2023   female   Rosemary Brown   2850 g (6 lb 4.5 oz)   Gestational Age: 39w6d        Baby Discharged: Discharged with Mom  Delivering Physician: Mariluz Block MD    The laceration was 2nd degree and is healing well. Patient describes vaginal bleeding as absent.  Patient is breast feeding.  She desires contraceptive methods: OCP (estrogen/progesterone) for contraception.  Patient denies bowel or bladder issues.      Patient denies postpartum depression. No treatment indicated.      Last Pap : 2023. Results: ASCUS. HPV: negative.   Last Completed Pap Smear            PAP SMEAR (Every 3 Years) Next due on 2023  LIQUID-BASED PAP SMEAR, [facility] ([facility])    2020  Done - negative, with yeast                      The additional following portions of the patient's history were reviewed and updated as appropriate: allergies, current medications, past family history, past medical history, past social history, past surgical history, and problem list.    Review of Systems  All other systems reviewed and are negative.     I have reviewed and agree with the HPI, ROS, and historical information as entered above. Mariluz Block MD      /80   Ht 157.5 cm (62\")   Wt 60.3 kg (133 lb)   LMP 2022   Breastfeeding Yes   BMI 24.33 kg/m²     Physical Exam  Vitals and nursing note reviewed. Exam conducted with a chaperone present.   Constitutional:       Appearance: She is well-developed.   HENT:      Head: Normocephalic and atraumatic.   Neck:      Thyroid: No thyroid mass or thyromegaly.   Pulmonary:      Breath sounds: No rhonchi.   Abdominal:      Palpations: Abdomen is soft. " Abdomen is not rigid. There is no mass.      Tenderness: There is no abdominal tenderness. There is no guarding.      Hernia: No hernia is present.   Genitourinary:     Vagina: Normal.      Cervix: Normal.      Uterus: Normal.    Musculoskeletal:      Cervical back: Normal range of motion. No muscular tenderness.   Neurological:      Mental Status: She is alert and oriented to person, place, and time.   Psychiatric:         Behavior: Behavior normal.             Assessment and Plan    Problem List Items Addressed This Visit    None  Visit Diagnoses       Postpartum exam    -  Primary    Encounter for initial prescription of contraceptive pills                S/p Vaginal delivery, 6 week(s) postpartum.  Doing well.    Return to normal physical activity.  No pelvic restrictions.   Baby doing well.  Breastfeeding going well.  No si/sx of postpartum depression  Contraception: contraceptive methods: OCP (estrogen/progesterone)  Return for Annual physical.     Mariluz Block MD  10/17/2023   Answers submitted by the patient for this visit:  Primary Reason for Visit (Submitted on 10/17/2023)  What is the primary reason for your visit?: Physical

## 2023-12-22 DIAGNOSIS — O99.019 IRON DEFICIENCY ANEMIA DURING PREGNANCY: ICD-10-CM

## 2023-12-22 DIAGNOSIS — D50.9 IRON DEFICIENCY ANEMIA DURING PREGNANCY: ICD-10-CM

## 2023-12-22 DIAGNOSIS — R53.83 OTHER FATIGUE: ICD-10-CM

## 2023-12-22 DIAGNOSIS — E55.9 VITAMIN D DEFICIENCY, UNSPECIFIED: ICD-10-CM

## 2023-12-22 DIAGNOSIS — E55.9 VITAMIN D DEFICIENCY: ICD-10-CM

## 2025-01-07 ENCOUNTER — INITIAL PRENATAL (OUTPATIENT)
Dept: OBSTETRICS AND GYNECOLOGY | Facility: CLINIC | Age: 35
End: 2025-01-07
Payer: COMMERCIAL

## 2025-01-07 VITALS — BODY MASS INDEX: 22.42 KG/M2 | WEIGHT: 122.6 LBS | SYSTOLIC BLOOD PRESSURE: 118 MMHG | DIASTOLIC BLOOD PRESSURE: 78 MMHG

## 2025-01-07 DIAGNOSIS — Z34.91 PRENATAL CARE IN FIRST TRIMESTER: Primary | ICD-10-CM

## 2025-01-07 PROBLEM — Z34.90 CURRENTLY PREGNANT: Status: RESOLVED | Noted: 2023-09-07 | Resolved: 2025-01-07

## 2025-01-07 NOTE — PROGRESS NOTES
Initial ob visit     CC- Here for care of pregnancy        Marian Samuels is a 34 y.o. female, , who presents for her first obstetrical visit.  Patient's last menstrual period was 10/26/2024.. Her ALISHA is 2025, by Last Menstrual Period. Current GA is 10w3d.     Initial positive test date : 2024, UPT        Her periods are every 4 weeks.  Prior obstetric issues: PPH  Patient's past medical history is significant for:  Denies .  Family history of genetic issues (includes FOB): Denies  Prior infections concerning in pregnancy (Rash, fever in last 2 weeks): No  Varicella Hx - vaccinated  Prior testing for Cystic Fibrosis Carrier or Sickle Cell Trait- Denies  Prepregnancy BMI - Body mass index is 22.42 kg/m².  History of STD: no  Hx of HSV for patient or partner: no  Ultrasound Today: yes    OB History    Para Term  AB Living   5 3 3   1 3   SAB IAB Ectopic Molar Multiple Live Births   1       0 3      # Outcome Date GA Lbr Jan/2nd Weight Sex Type Anes PTL Lv   5 Current            4 Term 23 39w6d 05:22 / 00:24 2850 g (6 lb 4.5 oz) F Vag-Spont EPI N JIE   3 Term 20 39w3d 08:45 / 00:44 3240 g (7 lb 2.3 oz) F Vag-Spont EPI N JIE   2 Term 18 38w0d  2722 g (6 lb) M Vag-Spont EPI  JIE      Complications: Postpartum hemorrhage   1 SAB 10/01/16 6w0d    SAB          Additional Pertinent History   Last Pap : 2023 Result: ASCUS HPV: negative     Last Completed Pap Smear            Ordered - PAP SMEAR (Every 3 Years) Ordered on 2023  LIQUID-BASED PAP SMEAR, [facility] ([facility])    2020  Done - negative, with yeast                  History of abnormal Pap smear: yes - Ascus  Family history of uterine, colon, breast, or ovarian cancer: no  Feelings of Anxiety or Depression: no  Tobacco Usage?: No   Alcohol/Drug Use?: NO  Over the age of 35 at delivery: no  Genetic Screening: desires cell free DNA  Flu Status: Already given in current flu  season    PMH    Current Outpatient Medications:     Cholecalciferol 25 MCG (1000 UT) tablet, Take 1 tablet by mouth Daily., Disp: , Rfl:     levonorgestrel-ethinyl estradiol (AVIANE,ALESSE,LESSINA) 0.1-20 MG-MCG per tablet, Take 1 tablet by mouth Daily., Disp: 84 tablet, Rfl: 3    Prenatal Vit-Fe Fumarate-FA (PRENATAL PO), Take  by mouth., Disp: , Rfl:      Past Medical History:   Diagnosis Date    Asthma     History of postpartum hemorrhage     History of third degree perineal laceration     PCOS (polycystic ovarian syndrome)         History reviewed. No pertinent surgical history.    Review of Systems   Review of Systems    Patient Reports: excessive nausea   Patient Denies:excessive vomiting and vaginal bleeding  All systems reviewed and otherwise normal.    I have reviewed and agree with the HPI, ROS, and historical information as entered above. Mariluz Block MD      /78   Wt 55.6 kg (122 lb 9.6 oz)   LMP 10/26/2024   BMI 22.42 kg/m²     The additional following portions of the patient's history were reviewed and updated as appropriate: allergies, current medications, past family history, past medical history, past social history, past surgical history, and problem list.    Physical Exam  General:  well developed; well nourished  no acute distress  mentation appropriate   Chest/Respiratory: No labored breathing, normal respiratory effort, normal appearance, no respiratory noises noted   Heart:  normal rate, regular rhythm,  no murmurs, rubs, or gallops   Thyroid: normal to inspection and palpation   Breasts:  Not performed.   Abdomen: soft, non-tender; no masses  no umbilical or inguinal hernias are present  no hepato-splenomegaly   Pelvis: Not performed.        Assessment and Plan    Problem List Items Addressed This Visit    None  Visit Diagnoses       Prenatal care in first trimester    -  Primary    Relevant Orders    Obstetric Panel    HIV-1 / O / 2 Ag / Antibody    Urine Culture - Urine, Urine,  Clean Catch    Urine Drug Screen - Urine, Clean Catch    LIQUID-BASED PAP SMEAR WITH HPV GENOTYPING REGARDLESS OF INTERPRETATION ([facility])    [id number] PLUS Core+SCA+ESS - Blood,    Ferritin            Pregnancy at 10w3d  Reviewed routine prenatal care with the office and educational materials given  Lab(s) Ordered  Discussed options for genetic testing including first trimester nuchal translucency screen, genetic disease carrier testing, quadruple screen, and NIPT  Patient is on Prenatal vitamins  Activity recommendation : 150 minutes/week of moderate intensity aerobic activity unless we limit for bleeding, hypertension or other pregnancy complication   Return in about 1 month (around 2/7/2025) for F/U Prenatal.      Mariluz Block MD  01/07/2025

## 2025-01-08 LAB
ABO GROUP BLD: ABNORMAL
AMPHETAMINES UR QL SCN: NEGATIVE NG/ML
BARBITURATES UR QL SCN: NEGATIVE NG/ML
BASOPHILS # BLD AUTO: 0.1 X10E3/UL (ref 0–0.2)
BASOPHILS NFR BLD AUTO: 1 %
BENZODIAZ UR QL SCN: NEGATIVE NG/ML
BLD GP AB SCN SERPL QL: NEGATIVE
BZE UR QL SCN: NEGATIVE NG/ML
CANNABINOIDS UR QL SCN: NEGATIVE NG/ML
CREAT UR-MCNC: 61.6 MG/DL (ref 20–300)
EOSINOPHIL # BLD AUTO: 0.2 X10E3/UL (ref 0–0.4)
EOSINOPHIL NFR BLD AUTO: 2 %
ERYTHROCYTE [DISTWIDTH] IN BLOOD BY AUTOMATED COUNT: 12.4 % (ref 11.7–15.4)
FERRITIN SERPL-MCNC: 17 NG/ML (ref 15–150)
HBV SURFACE AG SERPL QL IA: NEGATIVE
HCT VFR BLD AUTO: 39.5 % (ref 34–46.6)
HCV IGG SERPL QL IA: NON REACTIVE
HGB BLD-MCNC: 12.9 G/DL (ref 11.1–15.9)
HIV 1+2 AB+HIV1 P24 AG SERPL QL IA: NON REACTIVE
IMM GRANULOCYTES # BLD AUTO: 0.1 X10E3/UL (ref 0–0.1)
IMM GRANULOCYTES NFR BLD AUTO: 1 %
LABORATORY COMMENT REPORT: NORMAL
LYMPHOCYTES # BLD AUTO: 2.6 X10E3/UL (ref 0.7–3.1)
LYMPHOCYTES NFR BLD AUTO: 21 %
MCH RBC QN AUTO: 29.8 PG (ref 26.6–33)
MCHC RBC AUTO-ENTMCNC: 32.7 G/DL (ref 31.5–35.7)
MCV RBC AUTO: 91 FL (ref 79–97)
METHADONE UR QL SCN: NEGATIVE NG/ML
MONOCYTES # BLD AUTO: 0.6 X10E3/UL (ref 0.1–0.9)
MONOCYTES NFR BLD AUTO: 5 %
NEUTROPHILS # BLD AUTO: 8.8 X10E3/UL (ref 1.4–7)
NEUTROPHILS NFR BLD AUTO: 70 %
OPIATES UR QL SCN: NEGATIVE NG/ML
OXYCODONE+OXYMORPHONE UR QL SCN: NEGATIVE NG/ML
PCP UR QL: NEGATIVE NG/ML
PH UR: 7 [PH] (ref 4.5–8.9)
PLATELET # BLD AUTO: 378 X10E3/UL (ref 150–450)
PROPOXYPH UR QL SCN: NEGATIVE NG/ML
RBC # BLD AUTO: 4.33 X10E6/UL (ref 3.77–5.28)
REF LAB TEST METHOD: NORMAL
RH BLD: POSITIVE
RPR SER QL: NON REACTIVE
RUBV IGG SERPL IA-ACNC: 5.11 INDEX
WBC # BLD AUTO: 12.3 X10E3/UL (ref 3.4–10.8)

## 2025-01-09 LAB
BACTERIA UR CULT: NO GROWTH
BACTERIA UR CULT: NORMAL

## 2025-03-03 ENCOUNTER — TELEPHONE (OUTPATIENT)
Dept: OBSTETRICS AND GYNECOLOGY | Facility: CLINIC | Age: 35
End: 2025-03-03
Payer: COMMERCIAL

## 2025-03-03 DIAGNOSIS — O46.92 VAGINAL BLEEDING IN PREGNANCY, SECOND TRIMESTER: Primary | ICD-10-CM

## 2025-03-03 DIAGNOSIS — O26.899 PELVIC PAIN DURING PREGNANCY: ICD-10-CM

## 2025-03-03 DIAGNOSIS — R10.2 PELVIC PAIN DURING PREGNANCY: ICD-10-CM

## 2025-03-03 NOTE — TELEPHONE ENCOUNTER
Dr. Block OB pt.   18w2d  MBT: O +    S/w pt she states she has been having vaginal spotting, cramping and pelvic pain that is mild and has been present for about 2 days. The vaginal spotting is mild and brown in color but she has been wearing a pad.     Patient denies saturating 1 pad <1 hour, vaginal blood clots bigger than golf ball in size, recent I/C, heavy lifting, increased exercise    Per B.Meyer: patient can come in today or tomorrow for further evaluation and U/S before.    S/w pt she states she is unable to come into the office today but can come in tomorrow. Pt. Has been scheduled and front office has been notified to schedule U/S before appt tomorrow with BB.     U/S ordered.

## 2025-03-03 NOTE — TELEPHONE ENCOUNTER
Provider: Mariluz Block MD     Caller: Marian Samuels  Female, 34 y.o., 1990  MRN: [id number]  CSN: [id number]  Phone: [phone]    Relationship to Patient: SELF        Reason for Call: PT REPORTS VAG SPOTTING AS WELL AS PELVIC PAIN FOR ROBERT TWO DAYS, PT REQ A CALL BACK TO BE ADVISED        When did it start: 2 DAYS        Pregnant: (18w2d, 8/2/25)

## 2025-03-04 ENCOUNTER — ROUTINE PRENATAL (OUTPATIENT)
Dept: OBSTETRICS AND GYNECOLOGY | Facility: CLINIC | Age: 35
End: 2025-03-04
Payer: COMMERCIAL

## 2025-03-04 VITALS — DIASTOLIC BLOOD PRESSURE: 62 MMHG | BODY MASS INDEX: 23.96 KG/M2 | WEIGHT: 131 LBS | SYSTOLIC BLOOD PRESSURE: 106 MMHG

## 2025-03-04 DIAGNOSIS — O26.852 SPOTTING AFFECTING PREGNANCY IN SECOND TRIMESTER: ICD-10-CM

## 2025-03-04 DIAGNOSIS — O99.019 IRON DEFICIENCY ANEMIA DURING PREGNANCY: ICD-10-CM

## 2025-03-04 DIAGNOSIS — Z34.90 PRENATAL CARE, ANTEPARTUM: Primary | ICD-10-CM

## 2025-03-04 DIAGNOSIS — D50.9 IRON DEFICIENCY ANEMIA DURING PREGNANCY: ICD-10-CM

## 2025-03-04 LAB
GLUCOSE UR STRIP-MCNC: NEGATIVE MG/DL
PROT UR STRIP-MCNC: NEGATIVE MG/DL

## 2025-03-04 NOTE — PROGRESS NOTES
CC- vaginal bleeding       Marian Samuels is a 34 y.o.  18w3d patient being seen today for bleeding. It started 2 day(s) ago and has decreased since then. The bleeding was maroon in color. It is currently brown spotting. The patient's blood type is RH Positive. She denies recent intercourse. She has not had a recent cervical check. She has had some cramping that is bilateral and low in pelvis. .      Her prenatal care is complicated by (and status) :    Patient Active Problem List   Diagnosis    Iron deficiency anemia during pregnancy    Pregnancy         Ultrasound Today: Yes.  Findings showed fetus measuring appropriately for GA. . Placenta posterior fundal. Normal CL. Normal AF. I have reviewed the preliminary US report. Final report pending physician review. TRESSA Monteiro        ROS -   Patient Reports : Vaginal Spotting and Cramping  Patient Denies: Loss of Fluid, Vision Changes, Headaches, Nausea , Vomiting , Epigastric pain, and skin itching  Fetal Movement : normal  All other systems reviewed and are negative.       The additional following portions of the patient's history were reviewed and updated as appropriate: allergies, current medications, past medical history, past social history, past surgical history, and problem list.    I have reviewed and agree with the HPI, ROS, and historical information as entered above. TRESSA Monteiro      /62   Wt 59.4 kg (131 lb)   LMP 10/26/2024   BMI 23.96 kg/m²       EXAM:     Prenatal Vitals  BP: 106/62  Weight: 59.4 kg (131 lb)   Fetal Heart Rate: 145           Assessment and Plan    Problem List Items Addressed This Visit       Iron deficiency anemia during pregnancy     Other Visit Diagnoses       Prenatal care, antepartum    -  Primary    Relevant Orders    POC Urinalysis Dipstick (Completed)    Spotting affecting pregnancy in second trimester                Pregnancy at 18w3d. Spotting and  cramping both improved today. Pt is feeling occasional fetal movement.  Fetal status reassuring.   Advised pelvic rest, avoid heavy lifting, bleeding precautions reviewed.  Return for regularly scheduled OB appointment.  RTC for worsening symptoms  Return for Keep Regularly Scheduled Appointment.    Irving Meyer, APRN  03/04/2025

## 2025-03-20 DIAGNOSIS — Z34.92 PRENATAL CARE IN SECOND TRIMESTER: Primary | ICD-10-CM

## 2025-03-21 ENCOUNTER — ROUTINE PRENATAL (OUTPATIENT)
Dept: OBSTETRICS AND GYNECOLOGY | Facility: CLINIC | Age: 35
End: 2025-03-21
Payer: COMMERCIAL

## 2025-03-21 VITALS — WEIGHT: 129.4 LBS | DIASTOLIC BLOOD PRESSURE: 70 MMHG | SYSTOLIC BLOOD PRESSURE: 110 MMHG | BODY MASS INDEX: 23.67 KG/M2

## 2025-03-21 DIAGNOSIS — Z3A.20 20 WEEKS GESTATION OF PREGNANCY: ICD-10-CM

## 2025-03-21 DIAGNOSIS — Z34.92 PRENATAL CARE IN SECOND TRIMESTER: Primary | ICD-10-CM

## 2025-03-21 LAB
GLUCOSE UR STRIP-MCNC: NEGATIVE MG/DL
PROT UR STRIP-MCNC: NEGATIVE MG/DL

## 2025-03-21 PROCEDURE — 0502F SUBSEQUENT PRENATAL CARE: CPT | Performed by: OBSTETRICS & GYNECOLOGY

## 2025-03-21 NOTE — PROGRESS NOTES
OB FOLLOW UP  CC- Here for care of pregnancy        Marian Samuels is a 34 y.o.  20w6d patient being seen today for her obstetrical follow up visit. Patient reports vaginal spotting two weeks, she was seen at that time and had an ultrasound. She denies any vaginal bleeding since.      Her prenatal care is complicated by (and status) : see below.  Patient Active Problem List   Diagnosis    Iron deficiency anemia during pregnancy    Pregnancy       Flu Status: Already given in current flu season  Ultrasound Today: Yes  AFP was declined.    ROS -     Patient Denies: leaking of fluid, dysuria, excessive vomiting, and more than 6 contractions per hour  Fetal Movement : Yes  Other than what is documented in the HPI, all other systems reviewed and are negative.       The additional following portions of the patient's history were reviewed and updated as appropriate: allergies, current medications, past family history, past medical history, past social history, past surgical history, and problem list.      I have reviewed and agree with the HPI, ROS, and historical information as entered above. Mariluz Block MD      /70   Wt 58.7 kg (129 lb 6.4 oz)   LMP 10/26/2024   BMI 23.67 kg/m²       EXAM:     Prenatal Vitals  BP: 110/70  Weight: 58.7 kg (129 lb 6.4 oz)   Fetal Heart Rate: 148          Urine Glucose Read-only: Negative  Urine Protein Read-only: Negative       Assessment and Plan    Problem List Items Addressed This Visit       Pregnancy    Overview   3 prev  term            Other Visit Diagnoses         Prenatal care in second trimester    -  Primary    Relevant Orders    POC Urinalysis Dipstick (Completed)            Pregnancy at 20w6d  Anatomy scan today is complete and appear within normal limits.  Fetal status reassuring.   Activity and Exercise discussed.  Patient is on Prenatal vitamins  Return in about 1 month (around 2025) for F/U Prenatal.      Mariluz Block  MD  03/21/2025

## 2025-04-22 ENCOUNTER — ROUTINE PRENATAL (OUTPATIENT)
Dept: OBSTETRICS AND GYNECOLOGY | Facility: CLINIC | Age: 35
End: 2025-04-22
Payer: COMMERCIAL

## 2025-04-22 VITALS — SYSTOLIC BLOOD PRESSURE: 110 MMHG | DIASTOLIC BLOOD PRESSURE: 72 MMHG | WEIGHT: 136.4 LBS | BODY MASS INDEX: 24.95 KG/M2

## 2025-04-22 DIAGNOSIS — O99.019 IRON DEFICIENCY ANEMIA DURING PREGNANCY: Primary | ICD-10-CM

## 2025-04-22 DIAGNOSIS — Z3A.25 25 WEEKS GESTATION OF PREGNANCY: ICD-10-CM

## 2025-04-22 DIAGNOSIS — D50.9 IRON DEFICIENCY ANEMIA DURING PREGNANCY: Primary | ICD-10-CM

## 2025-04-22 DIAGNOSIS — Z34.92 PRENATAL CARE IN SECOND TRIMESTER, UNSPECIFIED GRAVIDITY: ICD-10-CM

## 2025-04-22 LAB
GLUCOSE UR STRIP-MCNC: NEGATIVE MG/DL
PROT UR STRIP-MCNC: NEGATIVE MG/DL

## 2025-04-22 PROCEDURE — 0502F SUBSEQUENT PRENATAL CARE: CPT | Performed by: OBSTETRICS & GYNECOLOGY

## 2025-04-22 NOTE — PROGRESS NOTES
OB FOLLOW UP  CC- Here for care of pregnancy        Marian Samuels is a 34 y.o.  25w3d patient being seen today for her obstetrical follow up visit. Patient reports no complaints.     Her prenatal care is complicated by (and status) : see below.  Patient Active Problem List   Diagnosis    Iron deficiency anemia during pregnancy    Pregnancy     Ultrasound Today: No  Reviewed 1 hr glucose testing and TDAP next visit.    ROS -   Patient Denies: leaking of fluid, vaginal bleeding, dysuria, excessive vomiting, and more than 6 contractions per hour  Fetal Movement : normal  Other than what is documented in the HPI, all other systems reviewed and are negative.       The additional following portions of the patient's history were reviewed and updated as appropriate: allergies, current medications, past family history, past medical history, past social history, past surgical history, and problem list.      I have reviewed and agree with the HPI, ROS, and historical information as entered above. Mariluz Block MD      /72   Wt 61.9 kg (136 lb 6.4 oz)   LMP 10/26/2024   BMI 24.95 kg/m²       EXAM:     Prenatal Vitals  BP: 110/72  Weight: 61.9 kg (136 lb 6.4 oz)   Fetal Heart Rate: +      Fundal Height (cm): 25 cm        Urine Glucose Read-only: Negative  Urine Protein Read-only: Negative       Assessment and Plan    Problem List Items Addressed This Visit          Gynecologic and Obstetric Problems    Iron deficiency anemia during pregnancy - Primary       Other    Pregnancy    Overview   3 prev  term            Other Visit Diagnoses         Prenatal care in second trimester, unspecified         Relevant Orders    POC Urinalysis Dipstick (Completed)            Pregnancy at 25w3d  Fetal status reassuring.  No US indicated today.  1 hour gtt, CBC, Antibody screen, TDAP, and RPR next visit. Instructions given  Discussed/encouraged TDAP vaccination after 28 weeks  Activity and Exercise  discussed.  Return in about 3 weeks (around 5/13/2025) for F/U Prenatal, and glucola.      Mariluz Block MD  04/22/2025

## 2025-05-13 ENCOUNTER — ROUTINE PRENATAL (OUTPATIENT)
Dept: OBSTETRICS AND GYNECOLOGY | Facility: CLINIC | Age: 35
End: 2025-05-13
Payer: COMMERCIAL

## 2025-05-13 VITALS — BODY MASS INDEX: 25.75 KG/M2 | WEIGHT: 140.8 LBS | SYSTOLIC BLOOD PRESSURE: 104 MMHG | DIASTOLIC BLOOD PRESSURE: 64 MMHG

## 2025-05-13 DIAGNOSIS — Z34.90 PRENATAL CARE, ANTEPARTUM, UNSPECIFIED GRAVIDITY: Primary | ICD-10-CM

## 2025-05-13 DIAGNOSIS — D50.9 IRON DEFICIENCY ANEMIA DURING PREGNANCY: ICD-10-CM

## 2025-05-13 DIAGNOSIS — Z3A.28 28 WEEKS GESTATION OF PREGNANCY: ICD-10-CM

## 2025-05-13 DIAGNOSIS — O99.019 IRON DEFICIENCY ANEMIA DURING PREGNANCY: ICD-10-CM

## 2025-05-13 NOTE — PROGRESS NOTES
OB FOLLOW UP  CC- Here for care of pregnancy        Marian Samuels is a 34 y.o.  28w3d patient being seen today for her obstetrical follow up. Patient reports no complaints.     Patient undergoing Glucola testing today. She is due for her testing at 9:58 AM.       MBT: O+  Rhogam: is not indicated.  28 week packet: reviewed with patient , counseled on fetal movement , pediatrician list reviewed, breast pump discussed, and childbirth classes reviewed  TDAP: given today  Ultrasound Today: No  Does patient need tubal consent or  consent signed? no    Her prenatal care is complicated by (and status) : see below.  Patient Active Problem List   Diagnosis    Iron deficiency anemia during pregnancy    Pregnancy         ROS -   Patient Denies: Loss of Fluid, Vaginal Spotting, Vision Changes, Headaches, Nausea , Vomiting , Contractions, Epigastric pain, and skin itching  Fetal Movement : normal  Other than what is documented in the HPI, all other systems reviewed and are negative.     The additional following portions of the patient's history were reviewed and updated as appropriate: allergies, current medications, past family history, past medical history, past social history, past surgical history, and problem list.    I have reviewed and agree with the HPI, ROS, and historical information as entered above. Emely Sher, APRN      /64   Wt 63.9 kg (140 lb 12.8 oz)   LMP 10/26/2024   BMI 25.75 kg/m²         EXAM:     Prenatal Vitals  BP: 104/64  Weight: 63.9 kg (140 lb 12.8 oz)   Fetal Heart Rate: 150         Assessment and Plan    Problem List Items Addressed This Visit          Gravid and     Pregnancy    Overview   3 prev  term  Maternity 21 negative              Hematology and Neoplasia    Iron deficiency anemia during pregnancy    Overview   Could not tolerate po iron last pregnancy-had to have IV iron transfusions-offer accufer if anemic          Other Visit Diagnoses          Prenatal care, antepartum, unspecified     -  Primary    Relevant Orders    POC Urinalysis Dipstick    CBC (No Diff)    Gestational Screen 1 Hr ([facility])    Antibody Screen    RPR, Rfx Qn RPR / Confirm TP    Tdap Vaccine => 6yo IM (BOOSTRIX/ADACEL) (Completed)            Pregnancy at 28w3d  1 hr Glucola, CBC, RPR. Antibody screen and TDAP today  Fetal movement/PTL or Labor precautions  Lab(s) Ordered  Patient is on Prenatal vitamins  Discussed/encouraged TDAP vaccination after 28 weeks  Reviewed Pre-eclampsia signs/symptoms  Activity and Exercise discussed.  Could not tolerate po iron last pregnancy-had to have IV iron transfusions-offer accufer if anemic  Follow up in four weeks NAGA Sher, APRN  2025

## 2025-05-14 LAB
BLD GP AB SCN SERPL QL: NEGATIVE
ERYTHROCYTE [DISTWIDTH] IN BLOOD BY AUTOMATED COUNT: 11.8 % (ref 12.3–15.4)
GLUCOSE 1H P 50 G GLC PO SERPL-MCNC: 88 MG/DL (ref 65–139)
HCT VFR BLD AUTO: 30.2 % (ref 34–46.6)
HGB BLD-MCNC: 10.1 G/DL (ref 12–15.9)
MCH RBC QN AUTO: 29.7 PG (ref 26.6–33)
MCHC RBC AUTO-ENTMCNC: 33.4 G/DL (ref 31.5–35.7)
MCV RBC AUTO: 88.8 FL (ref 79–97)
PLATELET # BLD AUTO: 277 10*3/MM3 (ref 140–450)
RBC # BLD AUTO: 3.4 10*6/MM3 (ref 3.77–5.28)
RPR SER QL: NON REACTIVE
WBC # BLD AUTO: 9.59 10*3/MM3 (ref 3.4–10.8)

## 2025-05-28 DIAGNOSIS — O99.019 MATERNAL ANEMIA IN PREGNANCY, ANTEPARTUM: Primary | ICD-10-CM

## 2025-05-28 PROBLEM — T45.4X5S ADVERSE EFFECT OF IRON AND ITS COMPOUNDS, SEQUELA: Status: ACTIVE | Noted: 2025-05-28

## 2025-05-28 RX ORDER — FAMOTIDINE 10 MG/ML
20 INJECTION, SOLUTION INTRAVENOUS AS NEEDED
OUTPATIENT
Start: 2025-05-28

## 2025-05-28 RX ORDER — SODIUM CHLORIDE 9 MG/ML
20 INJECTION, SOLUTION INTRAVENOUS ONCE
OUTPATIENT
Start: 2025-05-28

## 2025-05-28 RX ORDER — DIPHENHYDRAMINE HYDROCHLORIDE 50 MG/ML
50 INJECTION, SOLUTION INTRAMUSCULAR; INTRAVENOUS AS NEEDED
OUTPATIENT
Start: 2025-05-28

## 2025-05-28 RX ORDER — HYDROCORTISONE SODIUM SUCCINATE 100 MG/2ML
100 INJECTION INTRAMUSCULAR; INTRAVENOUS AS NEEDED
OUTPATIENT
Start: 2025-05-28

## 2025-06-04 ENCOUNTER — TELEPHONE (OUTPATIENT)
Dept: OBSTETRICS AND GYNECOLOGY | Facility: CLINIC | Age: 35
End: 2025-06-04
Payer: COMMERCIAL

## 2025-06-04 DIAGNOSIS — O99.019 IRON DEFICIENCY ANEMIA DURING PREGNANCY: Primary | ICD-10-CM

## 2025-06-04 DIAGNOSIS — D50.9 IRON DEFICIENCY ANEMIA DURING PREGNANCY: Primary | ICD-10-CM

## 2025-06-04 NOTE — TELEPHONE ENCOUNTER
Infusion center says pt will need iron profile and ferritin levels drawn prior to starting infusions. Orders have been placed. Voicemail and [employer] message sent to pt to let her know. She does not need appt to come in for labs.    Infusion center also stated that injectifir will most likely not be covered by her insurance. Will have [facility] work on PA for this.

## 2025-06-04 NOTE — TELEPHONE ENCOUNTER
Pt wanted lab orders for iron profile and ferritin faxed to  [phone] Diagnosis added O99.01 and K90.49

## 2025-06-06 RX ORDER — EPINEPHRINE 1 MG/ML
0.3 INJECTION, SOLUTION, CONCENTRATE INTRAVENOUS ONCE AS NEEDED
OUTPATIENT
Start: 2025-06-09

## 2025-06-06 RX ORDER — SODIUM CHLORIDE 9 MG/ML
20 INJECTION, SOLUTION INTRAVENOUS ONCE
OUTPATIENT
Start: 2025-06-09

## 2025-06-06 RX ORDER — SODIUM CHLORIDE 9 MG/ML
1000 INJECTION, SOLUTION INTRAVENOUS ONCE
OUTPATIENT
Start: 2025-06-09 | End: 2025-06-09

## 2025-06-10 ENCOUNTER — TELEPHONE (OUTPATIENT)
Dept: OBSTETRICS AND GYNECOLOGY | Facility: CLINIC | Age: 35
End: 2025-06-10
Payer: COMMERCIAL

## 2025-06-10 DIAGNOSIS — L73.2 HYDRADENITIS: Primary | ICD-10-CM

## 2025-06-10 RX ORDER — DOXYCYCLINE 100 MG/1
100 CAPSULE ORAL 2 TIMES DAILY
Qty: 60 CAPSULE | Refills: 2 | Status: SHIPPED | OUTPATIENT
Start: 2025-06-10 | End: 2025-06-10

## 2025-06-10 RX ORDER — CHLORHEXIDINE GLUCONATE 40 MG/ML
1 SOLUTION TOPICAL DAILY PRN
Qty: 532 ML | Refills: 1 | Status: SHIPPED | OUTPATIENT
Start: 2025-06-10 | End: 2025-06-10

## 2025-06-10 NOTE — TELEPHONE ENCOUNTER
I called [facility] as pt requested to send the amb referral for iron infusion. Fax number [phone] or [facility] either one. They need demographics, insurance info, labs last office note. The team with triage this request and that can take 24-48 hours.

## 2025-06-20 ENCOUNTER — ROUTINE PRENATAL (OUTPATIENT)
Dept: OBSTETRICS AND GYNECOLOGY | Facility: CLINIC | Age: 35
End: 2025-06-20
Payer: COMMERCIAL

## 2025-06-20 VITALS — SYSTOLIC BLOOD PRESSURE: 118 MMHG | BODY MASS INDEX: 26.78 KG/M2 | DIASTOLIC BLOOD PRESSURE: 72 MMHG | WEIGHT: 146.4 LBS

## 2025-06-20 DIAGNOSIS — D50.9 IRON DEFICIENCY ANEMIA DURING PREGNANCY: ICD-10-CM

## 2025-06-20 DIAGNOSIS — Z3A.33 33 WEEKS GESTATION OF PREGNANCY: ICD-10-CM

## 2025-06-20 DIAGNOSIS — O99.019 IRON DEFICIENCY ANEMIA DURING PREGNANCY: ICD-10-CM

## 2025-06-20 DIAGNOSIS — Z34.90 PRENATAL CARE, ANTEPARTUM, UNSPECIFIED GRAVIDITY: Primary | ICD-10-CM

## 2025-06-20 LAB
GLUCOSE UR STRIP-MCNC: NEGATIVE MG/DL
PROT UR STRIP-MCNC: NEGATIVE MG/DL

## 2025-06-20 NOTE — PROGRESS NOTES
OB FOLLOW UP  CC- Here for care of pregnancy        Marian Samuels is a 34 y.o.  33w6d patient being seen today for her obstetrical follow up visit. Patient reports fatigue and SOA with activity. She is still waiting for PA approval to get iron infusions at . Also reports infrequent BH contractions.    Her prenatal care is complicated by (and status) :    Patient Active Problem List   Diagnosis    Iron deficiency anemia during pregnancy    Pregnancy    Adverse effect of iron and its compounds, sequela         TDAP status: received at last visit  Rhogam status: was not indicated  28 week labs: Reviewed and Labs show anemia. She is taking additional iron supplement.  Ultrasound Today: No  Non Stress Test: No.      ROS -   Patient Denies: Loss of Fluid, Vaginal Spotting, Vision Changes, Headaches, Nausea , Vomiting , Epigastric pain, and skin itching  Fetal Movement : normal  Other than what is documented in the HPI, all other systems reviewed and are negative.     The additional following portions of the patient's history were reviewed and updated as appropriate: allergies, current medications, past family history, past medical history, past social history, past surgical history, and problem list.    I have reviewed and agree with the HPI, ROS, and historical information as entered above. Mariluz Block MD      /72   Wt 66.4 kg (146 lb 6.4 oz)   LMP 10/26/2024   BMI 26.78 kg/m²         EXAM:     Prenatal Vitals  BP: 118/72  Weight: 66.4 kg (146 lb 6.4 oz)   Fetal Heart Rate: +      Fundal Height (cm): 33 cm        Urine Glucose Read-only: Negative  Urine Protein Read-only: Negative           Assessment and Plan    Problem List Items Addressed This Visit          Gynecologic and Obstetric Problems    Iron deficiency anemia during pregnancy    Overview   Could not tolerate po iron last pregnancy-had to have IV iron transfusions-offer accufer if anemic         Relevant Medications     ferrous sulfate 325 (65 FE) MG tablet    Other Relevant Orders    US Ob Follow Up Transabdominal Approach    Ambulatory Referral to Infusion Treatment       Other    Pregnancy    Overview   3 prev  term  Maternity 21 negative            Other Visit Diagnoses         Prenatal care, antepartum, unspecified     -  Primary    Relevant Orders    POC Urinalysis Dipstick (Completed)            Pregnancy at 33w6d  Fetal status reassuring.  28 week labs reviewed.    Activity and Exercise discussed.  Fetal movement/PTL or Labor precautions  U/S ordered at follow up  Sched iron infusion here  Sched induction  Return in about 2 weeks (around 2025) for F/U Prenatal, U/S Next Visit.    Mariluz Block MD  2025

## 2025-06-23 ENCOUNTER — TELEPHONE (OUTPATIENT)
Dept: OBSTETRICS AND GYNECOLOGY | Facility: CLINIC | Age: 35
End: 2025-06-23
Payer: COMMERCIAL

## 2025-06-23 NOTE — TELEPHONE ENCOUNTER
Caller: Marian Samuels    Relationship: Self    Best call back number: [phone]     What is the best time to reach you: ANYTIME    Who are you requesting to speak with (clinical staff, provider,  specific staff member): CLINICAL    OB  PATIENT IS REQUESTING TO SPEAK WITH CLINICAL TO CONFIRM HOW MANY IRON INFUSIONS WILL BE NEEDED? PATIENT THOUGHT ORDER WAS FOR 3 INFUSIONS. INFUSION CENTER SAID ORDER IS FOR 5.

## 2025-06-23 NOTE — TELEPHONE ENCOUNTER
Patient of Dr. Block;  @ 34w 2d. LOV 25; 25.  Returned patient's call.   Discussed that original order was for Injectafer iron infusions x3. When insurance denied that, current order is for Iron Sucrose infusions x5.   Patient was scheduled for the 3rd infusion today; states it has been moved to tomorrow. Asking if she needs only 3 or if she needs all 5.  Advised her to discuss with Dr. Block at 25. Patient v/u and agreed.

## 2025-06-24 ENCOUNTER — HOSPITAL ENCOUNTER (OUTPATIENT)
Facility: HOSPITAL | Age: 35
Discharge: HOME | End: 2025-06-24
Admitting: OBSTETRICS & GYNECOLOGY
Payer: COMMERCIAL

## 2025-06-24 VITALS
WEIGHT: 147.8 LBS | RESPIRATION RATE: 18 BRPM | SYSTOLIC BLOOD PRESSURE: 112 MMHG | TEMPERATURE: 98.6 F | DIASTOLIC BLOOD PRESSURE: 68 MMHG | BODY MASS INDEX: 27.03 KG/M2 | HEART RATE: 87 BPM

## 2025-06-24 DIAGNOSIS — Z3A.31 31 WEEKS GESTATION OF PREGNANCY: ICD-10-CM

## 2025-06-24 DIAGNOSIS — O99.019 IRON DEFICIENCY ANEMIA DURING PREGNANCY: ICD-10-CM

## 2025-06-24 DIAGNOSIS — D50.9 IRON DEFICIENCY ANEMIA DURING PREGNANCY: ICD-10-CM

## 2025-06-24 DIAGNOSIS — T45.4X5S ADVERSE EFFECT OF IRON AND ITS COMPOUNDS, SEQUELA: Primary | ICD-10-CM

## 2025-06-24 PROCEDURE — 25810000003 SODIUM CHLORIDE 0.9 % SOLUTION 250 ML FLEX CONT: Performed by: OBSTETRICS & GYNECOLOGY

## 2025-06-24 PROCEDURE — 25810000003 SODIUM CHLORIDE 0.9 % SOLUTION: Performed by: OBSTETRICS & GYNECOLOGY

## 2025-06-24 PROCEDURE — 25010000002 IRON SUCROSE PER 1 MG: Performed by: OBSTETRICS & GYNECOLOGY

## 2025-06-24 PROCEDURE — 96366 THER/PROPH/DIAG IV INF ADDON: CPT

## 2025-06-24 PROCEDURE — 96365 THER/PROPH/DIAG IV INF INIT: CPT

## 2025-06-24 RX ORDER — METHYLPREDNISOLONE SODIUM SUCCINATE 125 MG/2ML
125 INJECTION, POWDER, LYOPHILIZED, FOR SOLUTION INTRAMUSCULAR; INTRAVENOUS ONCE AS NEEDED
Status: DISCONTINUED | OUTPATIENT
Start: 2025-06-24 | End: 2025-06-25 | Stop reason: HOSPADM

## 2025-06-24 RX ORDER — METHYLPREDNISOLONE SODIUM SUCCINATE 125 MG/2ML
125 INJECTION, POWDER, LYOPHILIZED, FOR SOLUTION INTRAMUSCULAR; INTRAVENOUS ONCE AS NEEDED
OUTPATIENT
Start: 2025-07-01

## 2025-06-24 RX ORDER — SODIUM CHLORIDE 9 MG/ML
20 INJECTION, SOLUTION INTRAVENOUS ONCE
OUTPATIENT
Start: 2025-07-01

## 2025-06-24 RX ORDER — SODIUM CHLORIDE 9 MG/ML
1000 INJECTION, SOLUTION INTRAVENOUS ONCE
Status: DISCONTINUED | OUTPATIENT
Start: 2025-06-24 | End: 2025-06-25 | Stop reason: HOSPADM

## 2025-06-24 RX ORDER — SODIUM CHLORIDE 9 MG/ML
20 INJECTION, SOLUTION INTRAVENOUS ONCE
Status: COMPLETED | OUTPATIENT
Start: 2025-06-24 | End: 2025-06-24

## 2025-06-24 RX ORDER — SODIUM CHLORIDE 9 MG/ML
1000 INJECTION, SOLUTION INTRAVENOUS ONCE
OUTPATIENT
Start: 2025-07-01 | End: 2025-07-01

## 2025-06-24 RX ADMIN — SODIUM CHLORIDE 20 ML/HR: 9 INJECTION, SOLUTION INTRAVENOUS at 09:56

## 2025-06-24 RX ADMIN — IRON SUCROSE 300 MG: 20 INJECTION, SOLUTION INTRAVENOUS at 09:57

## 2025-06-26 ENCOUNTER — ROUTINE PRENATAL (OUTPATIENT)
Dept: OBSTETRICS AND GYNECOLOGY | Facility: CLINIC | Age: 35
End: 2025-06-26
Payer: COMMERCIAL

## 2025-06-26 VITALS — DIASTOLIC BLOOD PRESSURE: 72 MMHG | WEIGHT: 147 LBS | SYSTOLIC BLOOD PRESSURE: 112 MMHG | BODY MASS INDEX: 26.89 KG/M2

## 2025-06-26 DIAGNOSIS — Z3A.34 34 WEEKS GESTATION OF PREGNANCY: ICD-10-CM

## 2025-06-26 DIAGNOSIS — Z34.90 PRENATAL CARE, ANTEPARTUM, UNSPECIFIED GRAVIDITY: Primary | ICD-10-CM

## 2025-06-26 LAB
GLUCOSE UR STRIP-MCNC: NEGATIVE MG/DL
PROT UR STRIP-MCNC: NEGATIVE MG/DL

## 2025-06-26 NOTE — PROGRESS NOTES
OB FOLLOW UP  CC- Here for care of pregnancy        Marian Samuels is a 34 y.o.  34w5d patient being seen today for her obstetrical follow up visit. Patient reports no complaints.     She received her first iron infusion on 25. States her SOA and energy levels are improving.      Her prenatal care is complicated by (and status) :   Patient Active Problem List   Diagnosis    Iron deficiency anemia during pregnancy    Pregnancy    Adverse effect of iron and its compounds, sequela         Ultrasound Today: Yes EFW 70% HC 95% breech  Non Stress Test: No.    ROS -   Patient Denies: Loss of Fluid, Vaginal Spotting, Vision Changes, Headaches, Nausea , Vomiting , Contractions, Epigastric pain, and skin itching  Fetal Movement : normal  Other than what is documented in the HPI, all other systems reviewed and are negative.       The additional following portions of the patient's history were reviewed and updated as appropriate: allergies, current medications, past family history, past medical history, past social history, past surgical history, and problem list.    I have reviewed and agree with the HPI, ROS, and historical information as entered above. Mariluz Block MD      /72   Wt 66.7 kg (147 lb)   LMP 10/26/2024   BMI 26.89 kg/m²       EXAM:     Prenatal Vitals  BP: 112/72  Weight: 66.7 kg (147 lb)   Fetal Heart Rate: 146               Urine Glucose Read-only: Negative  Urine Protein Read-only: Negative           Assessment and Plan    Problem List Items Addressed This Visit       Pregnancy    Overview   3 prev  term  Maternity 21 negative  EFW 34 wks 70%ile BREECH. Repeat position check 2 wks            Other Visit Diagnoses         Prenatal care, antepartum, unspecified     -  Primary    Relevant Orders    POC Urinalysis Dipstick (Completed)    US Ob Limited 1 + Fetuses            Pregnancy at 34w5d. US today baby measuring 70%ile. Breech, with cord below breech on todays  scan, MARIA ELENA 20cm. Will repeat US in 2 weeks to recheck position. She will start checking fsbs to make sure has not dev GDM. Her glucola was normal.   Fetal status reassuring.   Activity and Exercise discussed.  Fetal movement/PTL or Labor precautions  GBS next visit  Return in about 2 weeks (around 7/10/2025) for F/U Prenatal, U/S Next Visit.    Mariluz Block MD  06/26/2025

## 2025-06-27 PROBLEM — O24.410 DIET CONTROLLED GESTATIONAL DIABETES MELLITUS (GDM), ANTEPARTUM: Status: ACTIVE | Noted: 2025-06-27

## 2025-07-01 ENCOUNTER — HOSPITAL ENCOUNTER (OUTPATIENT)
Facility: HOSPITAL | Age: 35
Discharge: HOME | End: 2025-07-01
Admitting: OBSTETRICS & GYNECOLOGY
Payer: COMMERCIAL

## 2025-07-01 VITALS
SYSTOLIC BLOOD PRESSURE: 108 MMHG | WEIGHT: 145.6 LBS | BODY MASS INDEX: 26.79 KG/M2 | DIASTOLIC BLOOD PRESSURE: 68 MMHG | HEIGHT: 62 IN | RESPIRATION RATE: 18 BRPM | HEART RATE: 96 BPM | TEMPERATURE: 98.1 F

## 2025-07-01 DIAGNOSIS — D50.9 IRON DEFICIENCY ANEMIA DURING PREGNANCY: ICD-10-CM

## 2025-07-01 DIAGNOSIS — Z3A.31 31 WEEKS GESTATION OF PREGNANCY: ICD-10-CM

## 2025-07-01 DIAGNOSIS — T45.4X5S ADVERSE EFFECT OF IRON AND ITS COMPOUNDS, SEQUELA: Primary | ICD-10-CM

## 2025-07-01 DIAGNOSIS — O99.019 IRON DEFICIENCY ANEMIA DURING PREGNANCY: ICD-10-CM

## 2025-07-01 PROCEDURE — 25810000003 SODIUM CHLORIDE 0.9 % SOLUTION 250 ML FLEX CONT: Performed by: OBSTETRICS & GYNECOLOGY

## 2025-07-01 PROCEDURE — 25010000002 IRON SUCROSE PER 1 MG: Performed by: OBSTETRICS & GYNECOLOGY

## 2025-07-01 PROCEDURE — 25810000003 SODIUM CHLORIDE 0.9 % SOLUTION: Performed by: OBSTETRICS & GYNECOLOGY

## 2025-07-01 PROCEDURE — 96365 THER/PROPH/DIAG IV INF INIT: CPT

## 2025-07-01 RX ORDER — SODIUM CHLORIDE 9 MG/ML
1000 INJECTION, SOLUTION INTRAVENOUS ONCE
Status: DISCONTINUED | OUTPATIENT
Start: 2025-07-01 | End: 2025-07-02 | Stop reason: HOSPADM

## 2025-07-01 RX ORDER — SODIUM CHLORIDE 9 MG/ML
1000 INJECTION, SOLUTION INTRAVENOUS ONCE
OUTPATIENT
Start: 2025-07-08 | End: 2025-07-08

## 2025-07-01 RX ORDER — METHYLPREDNISOLONE SODIUM SUCCINATE 125 MG/2ML
125 INJECTION, POWDER, LYOPHILIZED, FOR SOLUTION INTRAMUSCULAR; INTRAVENOUS ONCE AS NEEDED
OUTPATIENT
Start: 2025-07-08

## 2025-07-01 RX ORDER — SODIUM CHLORIDE 9 MG/ML
20 INJECTION, SOLUTION INTRAVENOUS ONCE
Status: COMPLETED | OUTPATIENT
Start: 2025-07-01 | End: 2025-07-01

## 2025-07-01 RX ORDER — METHYLPREDNISOLONE SODIUM SUCCINATE 125 MG/2ML
125 INJECTION, POWDER, LYOPHILIZED, FOR SOLUTION INTRAMUSCULAR; INTRAVENOUS ONCE AS NEEDED
Status: DISCONTINUED | OUTPATIENT
Start: 2025-07-01 | End: 2025-07-02 | Stop reason: HOSPADM

## 2025-07-01 RX ORDER — SODIUM CHLORIDE 9 MG/ML
20 INJECTION, SOLUTION INTRAVENOUS ONCE
OUTPATIENT
Start: 2025-07-08

## 2025-07-01 RX ADMIN — SODIUM CHLORIDE 20 ML/HR: 9 INJECTION, SOLUTION INTRAVENOUS at 13:22

## 2025-07-01 RX ADMIN — IRON SUCROSE 300 MG: 20 INJECTION, SOLUTION INTRAVENOUS at 13:28

## 2025-07-08 ENCOUNTER — ROUTINE PRENATAL (OUTPATIENT)
Dept: OBSTETRICS AND GYNECOLOGY | Facility: CLINIC | Age: 35
End: 2025-07-08
Payer: COMMERCIAL

## 2025-07-08 ENCOUNTER — LAB (OUTPATIENT)
Dept: LAB | Facility: HOSPITAL | Age: 35
End: 2025-07-08
Payer: COMMERCIAL

## 2025-07-08 ENCOUNTER — HOSPITAL ENCOUNTER (OUTPATIENT)
Facility: HOSPITAL | Age: 35
Discharge: HOME | End: 2025-07-08
Payer: COMMERCIAL

## 2025-07-08 VITALS
HEIGHT: 62 IN | SYSTOLIC BLOOD PRESSURE: 103 MMHG | BODY MASS INDEX: 27.16 KG/M2 | WEIGHT: 147.6 LBS | TEMPERATURE: 98.4 F | DIASTOLIC BLOOD PRESSURE: 66 MMHG | RESPIRATION RATE: 18 BRPM | HEART RATE: 78 BPM

## 2025-07-08 VITALS — BODY MASS INDEX: 26.96 KG/M2 | SYSTOLIC BLOOD PRESSURE: 116 MMHG | DIASTOLIC BLOOD PRESSURE: 72 MMHG | WEIGHT: 147.4 LBS

## 2025-07-08 DIAGNOSIS — D50.9 IRON DEFICIENCY ANEMIA DURING PREGNANCY: ICD-10-CM

## 2025-07-08 DIAGNOSIS — Z3A.31 31 WEEKS GESTATION OF PREGNANCY: ICD-10-CM

## 2025-07-08 DIAGNOSIS — Z34.93 PRENATAL CARE IN THIRD TRIMESTER, UNSPECIFIED GRAVIDITY: Primary | ICD-10-CM

## 2025-07-08 DIAGNOSIS — O99.019 IRON DEFICIENCY ANEMIA DURING PREGNANCY: ICD-10-CM

## 2025-07-08 DIAGNOSIS — O24.410 DIET CONTROLLED GESTATIONAL DIABETES MELLITUS (GDM), ANTEPARTUM: ICD-10-CM

## 2025-07-08 DIAGNOSIS — Z34.93 THIRD TRIMESTER PREGNANCY: Primary | ICD-10-CM

## 2025-07-08 DIAGNOSIS — Z34.90 PREGNANCY, UNSPECIFIED GESTATIONAL AGE: ICD-10-CM

## 2025-07-08 DIAGNOSIS — T45.4X5S ADVERSE EFFECT OF IRON AND ITS COMPOUNDS, SEQUELA: Primary | ICD-10-CM

## 2025-07-08 LAB
GLUCOSE UR STRIP-MCNC: NEGATIVE MG/DL
PROT UR STRIP-MCNC: NEGATIVE MG/DL

## 2025-07-08 PROCEDURE — 96366 THER/PROPH/DIAG IV INF ADDON: CPT

## 2025-07-08 PROCEDURE — 25010000002 IRON SUCROSE PER 1 MG: Performed by: OBSTETRICS & GYNECOLOGY

## 2025-07-08 PROCEDURE — 96365 THER/PROPH/DIAG IV INF INIT: CPT

## 2025-07-08 PROCEDURE — 25810000003 SODIUM CHLORIDE 0.9 % SOLUTION: Performed by: OBSTETRICS & GYNECOLOGY

## 2025-07-08 PROCEDURE — 25810000003 SODIUM CHLORIDE 0.9 % SOLUTION 250 ML FLEX CONT: Performed by: OBSTETRICS & GYNECOLOGY

## 2025-07-08 PROCEDURE — 87081 CULTURE SCREEN ONLY: CPT

## 2025-07-08 RX ORDER — METHYLPREDNISOLONE SODIUM SUCCINATE 125 MG/2ML
125 INJECTION, POWDER, LYOPHILIZED, FOR SOLUTION INTRAMUSCULAR; INTRAVENOUS ONCE AS NEEDED
OUTPATIENT
Start: 2025-07-15

## 2025-07-08 RX ORDER — SODIUM CHLORIDE 9 MG/ML
1000 INJECTION, SOLUTION INTRAVENOUS ONCE
OUTPATIENT
Start: 2025-07-15 | End: 2025-07-15

## 2025-07-08 RX ORDER — METHYLPREDNISOLONE SODIUM SUCCINATE 125 MG/2ML
125 INJECTION, POWDER, LYOPHILIZED, FOR SOLUTION INTRAMUSCULAR; INTRAVENOUS ONCE AS NEEDED
Status: DISCONTINUED | OUTPATIENT
Start: 2025-07-08 | End: 2025-07-09 | Stop reason: HOSPADM

## 2025-07-08 RX ORDER — SODIUM CHLORIDE 9 MG/ML
1000 INJECTION, SOLUTION INTRAVENOUS ONCE
Status: DISCONTINUED | OUTPATIENT
Start: 2025-07-08 | End: 2025-07-09 | Stop reason: HOSPADM

## 2025-07-08 RX ORDER — SODIUM CHLORIDE 9 MG/ML
20 INJECTION, SOLUTION INTRAVENOUS ONCE
Status: COMPLETED | OUTPATIENT
Start: 2025-07-08 | End: 2025-07-08

## 2025-07-08 RX ORDER — SODIUM CHLORIDE 9 MG/ML
20 INJECTION, SOLUTION INTRAVENOUS ONCE
OUTPATIENT
Start: 2025-07-15

## 2025-07-08 RX ADMIN — SODIUM CHLORIDE 20 ML/HR: 9 INJECTION, SOLUTION INTRAVENOUS at 11:26

## 2025-07-08 RX ADMIN — SODIUM CHLORIDE 300 MG: 9 INJECTION, SOLUTION INTRAVENOUS at 11:33

## 2025-07-08 NOTE — PROGRESS NOTES
OB FOLLOW UP  CC- Here for care of pregnancy        Marian Samuels is a 34 y.o.  36w3d patient being seen today for her obstetrical follow up visit. Patient reports intermittent lower abdominal pain. She also reports infrequent millie pak/contractions.     Patient states that she has been watching carbs which has improved her 2 hour PP BG.     Her prenatal care is complicated by (and status) : see below. and GDM diet controlled. Her fasting BG range has been 80's-90's. Her 2hr PP BG range has been 150's-160's.  Patient Active Problem List   Diagnosis    Iron deficiency anemia during pregnancy    Pregnancy    Adverse effect of iron and its compounds, sequela    Diet controlled gestational diabetes mellitus (GDM), antepartum       GBS Status: Done Today. She is not allergic to PCN.    No Known Allergies       Her Delivery Plan is: Desires IOL at 39wks. Scheduled    US done today: Yes.  Findings showed , normal AF, and presentation-transverse head maternal right. Fetal anatomy shows hydronephrosis rt 7.4 mm lt 10.3 mm/ I have personally evaluated the U/S and agree with the findings. Mariluz Block MD    Fetal Non-Stress Test  Patient: Marian Samuels  : 1990  MRN: [id number]  CSN: [id number]  Date: 2025    Estimated Date of Delivery: 25  Gestational Age: 36w3d    Non Stress Test: minutes 20  non-stress test: NST: Reactive  indication: Diabetes Mellitus Gestational     Mariluz Block MD      ROS -   Patient Denies: Loss of Fluid, Vaginal Spotting, Vision Changes, Headaches, Epigastric pain, and skin itching  Fetal Movement : normal  Other than what is documented in the HPI, all other systems reviewed and are negative.       The additional following portions of the patient's history were reviewed and updated as appropriate: allergies, current medications, past family history, past medical history, past social history, past surgical history, and problem list.    I  have reviewed and agree with the HPI, ROS, and historical information as entered above. Mariluz Block MD        EXAM:     Prenatal Vitals  BP: 116/72  Weight: 66.9 kg (147 lb 6.4 oz)   Fetal Heart Rate: 136              Urine Glucose Read-only: Negative  Urine Protein Read-only: Negative           Assessment and Plan    Problem List Items Addressed This Visit          Gynecologic and Obstetric Problems    Iron deficiency anemia during pregnancy    Overview   Could not tolerate po iron last pregnancy-had to have IV iron transfusions-offer accufer if anemic         Relevant Medications    sodium chloride 0.9 % infusion (Completed)    iron sucrose (VENOFER) 300 mg in sodium chloride 0.9 % 250 mL IVPB (Completed)    methylPREDNISolone sodium succinate (SOLU-Medrol) injection 125 mg    methylPREDNISolone sodium succinate (SOLU-Medrol) injection 125 mg    EPINEPHrine (ADRENALIN) injection 0.3 mg    sodium chloride 0.9 % infusion 1,000 mL       Other    Pregnancy    Overview   3 prev  term  Maternity 21 negative  EFW 34 wks 70%ile BREECH. Repeat position check 2 wks           Relevant Medications    sodium chloride 0.9 % infusion (Completed)    iron sucrose (VENOFER) 300 mg in sodium chloride 0.9 % 250 mL IVPB (Completed)    methylPREDNISolone sodium succinate (SOLU-Medrol) injection 125 mg    methylPREDNISolone sodium succinate (SOLU-Medrol) injection 125 mg    EPINEPHrine (ADRENALIN) injection 0.3 mg    sodium chloride 0.9 % infusion 1,000 mL    Diet controlled gestational diabetes mellitus (GDM), antepartum    Overview   [ ] ASA 81MG 11-12 weeks until delivery if indicated  [ ] Send BG values to PDC or Endocrinology weekly  [ ] Twice weekly NSTs 32 weeks until delivery  [ ] 32 and 36 week growth US  [ ] Consider delivery 37-38 weeks if BG values uncontrolled          Relevant Orders    US Ob Follow Up Transabdominal Approach    US Fetal Biophysical Profile;Without Non-Stress Testing     Other Visit Diagnoses          Prenatal care in third trimester, unspecified     -  Primary    Relevant Orders    POC Urinalysis Dipstick (Completed)    Group B Streptococcus Culture - Swab, Vaginal/Rectum            Pregnancy at 36w3d  US today for position check, now transverse lie. Will plan on repeating her US in 1 week with growth/ BPP and position. If non vertex, will plan ECV next week with induction afterwards. Her BS are improved with diet, but still some suboptimal control of PP. If ECV is successfuil, will induce afterwards.   Fetal status reassuring.   Reviewed FSBS goals: fasting <90, 2hr PP < 120  Reviewed Pre-eclampsia signs/symptoms  Delivery options reviewed with patient  Signs of labor reviewed  Kick counts reviewed  Activity and Exercise discussed.  Return in about 1 week (around 7/15/2025) for F/U Prenatal, U/S Next Visit, with BPP.    Mariluz Block MD  2025

## 2025-07-11 LAB — BACTERIA SPEC AEROBE CULT: NORMAL

## 2025-07-15 ENCOUNTER — PREP FOR SURGERY (OUTPATIENT)
Dept: OTHER | Facility: HOSPITAL | Age: 35
End: 2025-07-15
Payer: COMMERCIAL

## 2025-07-15 ENCOUNTER — ROUTINE PRENATAL (OUTPATIENT)
Dept: OBSTETRICS AND GYNECOLOGY | Facility: CLINIC | Age: 35
End: 2025-07-15
Payer: COMMERCIAL

## 2025-07-15 VITALS — BODY MASS INDEX: 27.07 KG/M2 | WEIGHT: 148 LBS | SYSTOLIC BLOOD PRESSURE: 116 MMHG | DIASTOLIC BLOOD PRESSURE: 70 MMHG

## 2025-07-15 DIAGNOSIS — O24.410 DIET CONTROLLED GESTATIONAL DIABETES MELLITUS (GDM), ANTEPARTUM: Primary | ICD-10-CM

## 2025-07-15 DIAGNOSIS — O99.019 IRON DEFICIENCY ANEMIA DURING PREGNANCY: ICD-10-CM

## 2025-07-15 DIAGNOSIS — Z34.93 PRENATAL CARE IN THIRD TRIMESTER, UNSPECIFIED GRAVIDITY: ICD-10-CM

## 2025-07-15 DIAGNOSIS — Z34.90 PREGNANCY, UNSPECIFIED GESTATIONAL AGE: ICD-10-CM

## 2025-07-15 DIAGNOSIS — D50.9 IRON DEFICIENCY ANEMIA DURING PREGNANCY: ICD-10-CM

## 2025-07-15 LAB
GLUCOSE UR STRIP-MCNC: NEGATIVE MG/DL
PROT UR STRIP-MCNC: NEGATIVE MG/DL

## 2025-07-15 RX ORDER — SODIUM CHLORIDE 9 MG/ML
40 INJECTION, SOLUTION INTRAVENOUS AS NEEDED
Status: CANCELLED | OUTPATIENT
Start: 2025-07-15

## 2025-07-15 RX ORDER — ACETAMINOPHEN 500 MG
1000 TABLET ORAL ONCE
Status: CANCELLED | OUTPATIENT
Start: 2025-07-15 | End: 2025-07-15

## 2025-07-15 RX ORDER — LIDOCAINE HYDROCHLORIDE 10 MG/ML
0.5 INJECTION, SOLUTION EPIDURAL; INFILTRATION; INTRACAUDAL; PERINEURAL ONCE AS NEEDED
Status: CANCELLED | OUTPATIENT
Start: 2025-07-15

## 2025-07-15 RX ORDER — KETOROLAC TROMETHAMINE 30 MG/ML
30 INJECTION, SOLUTION INTRAMUSCULAR; INTRAVENOUS ONCE
Status: CANCELLED | OUTPATIENT
Start: 2025-07-15 | End: 2025-07-15

## 2025-07-15 RX ORDER — MISOPROSTOL 200 UG/1
800 TABLET ORAL AS NEEDED
Status: CANCELLED | OUTPATIENT
Start: 2025-07-15

## 2025-07-15 RX ORDER — SODIUM CHLORIDE 0.9 % (FLUSH) 0.9 %
10 SYRINGE (ML) INJECTION AS NEEDED
Status: CANCELLED | OUTPATIENT
Start: 2025-07-15

## 2025-07-15 RX ORDER — BUPIVACAINE HCL/0.9 % NACL/PF 0.1 %
2 PLASTIC BAG, INJECTION (ML) EPIDURAL ONCE
Status: CANCELLED | OUTPATIENT
Start: 2025-07-15 | End: 2025-07-15

## 2025-07-15 RX ORDER — CITRIC ACID/SODIUM CITRATE 334-500MG
30 SOLUTION, ORAL ORAL ONCE
Status: CANCELLED | OUTPATIENT
Start: 2025-07-15 | End: 2025-07-15

## 2025-07-15 RX ORDER — METHYLERGONOVINE MALEATE 0.2 MG/ML
200 INJECTION INTRAVENOUS ONCE AS NEEDED
Status: CANCELLED | OUTPATIENT
Start: 2025-07-15

## 2025-07-15 RX ORDER — CARBOPROST TROMETHAMINE 250 UG/ML
250 INJECTION, SOLUTION INTRAMUSCULAR AS NEEDED
Status: CANCELLED | OUTPATIENT
Start: 2025-07-15

## 2025-07-15 RX ORDER — OXYTOCIN/0.9 % SODIUM CHLORIDE 30/500 ML
85 PLASTIC BAG, INJECTION (ML) INTRAVENOUS ONCE
Status: CANCELLED | OUTPATIENT
Start: 2025-07-15

## 2025-07-15 RX ORDER — OXYTOCIN/0.9 % SODIUM CHLORIDE 30/500 ML
650 PLASTIC BAG, INJECTION (ML) INTRAVENOUS ONCE
Status: CANCELLED | OUTPATIENT
Start: 2025-07-15

## 2025-07-15 RX ORDER — SODIUM CHLORIDE 0.9 % (FLUSH) 0.9 %
10 SYRINGE (ML) INJECTION EVERY 12 HOURS SCHEDULED
Status: CANCELLED | OUTPATIENT
Start: 2025-07-15

## 2025-07-15 NOTE — PROGRESS NOTES
OB FOLLOW UP  CC- Here for care of pregnancy        Marian Samuels is a 34 y.o.  37w3d patient being seen today for her obstetrical follow up visit. Patient reports infrequent millie pak.     Her prenatal care is complicated by (and status) : see below. and GDM diet controlled. Her fasting BG range has been 80's-90's. Her 2hr PP BG range has been 160's-170's.  Patient Active Problem List   Diagnosis    Iron deficiency anemia during pregnancy    Pregnancy    Adverse effect of iron and its compounds, sequela    Diet controlled gestational diabetes mellitus (GDM), antepartum       GBS Status:   Group B Strep Culture   Date Value Ref Range Status   2025 No Group B Streptococcus isolated  Final         No Known Allergies       Her Delivery Plan is: Desires IOL-scheduled  US done today: Yes.  Findings showed BPP 8/8, , Dino breech, normal AF, EFW 83%, 7lbs 12oz.  I have personally evaluated the U/S and agree with the findings. Mariluz Block MD    Non Stress Test: No.    ROS -   Patient Denies: Loss of Fluid, Vaginal Spotting, Vision Changes, Headaches, Contractions, Epigastric pain, and skin itching  Fetal Movement : normal  Other than what is documented in the HPI, all other systems reviewed and are negative.       The additional following portions of the patient's history were reviewed and updated as appropriate: allergies, current medications, past family history, past medical history, past social history, past surgical history, and problem list.    I have reviewed and agree with the HPI, ROS, and historical information as entered above. Mariluz Block MD        EXAM:     Prenatal Vitals  BP: 116/70  Weight: 67.1 kg (148 lb)   Fetal Heart Rate: 172              Urine Glucose Read-only: Negative  Urine Protein Read-only: Negative           Assessment and Plan    Problem List Items Addressed This Visit          Gynecologic and Obstetric Problems    Iron deficiency anemia during  pregnancy    Overview   Could not tolerate po iron last pregnancy-had to have IV iron transfusions-offer accufer if anemic            Other    Pregnancy    Overview   3 prev  term  Maternity 21 negative  EFW 34 wks 70%ile BREECH. Repeat position check 2 wks           Diet controlled gestational diabetes mellitus (GDM), antepartum - Primary    Overview   [ ] ASA 81MG 11-12 weeks until delivery if indicated  [ ] Send BG values to PDC or Endocrinology weekly  [ ] Twice weekly NSTs 32 weeks until delivery  [ ] 32 and 36 week growth US  [ ] Consider delivery 37-38 weeks if BG values uncontrolled           Other Visit Diagnoses         Prenatal care in third trimester, unspecified         Relevant Orders    POC Urinalysis Dipstick (Completed)            Pregnancy at 37w3d. US today baby justice breech. Her GDM is suboptimal  control. Will proceed with ECV on Thursday, and induce if successful and 1 c/s if not same day.   Fetal status reassuring.   Reviewed FSBS goals: fasting <90, 2hr PP < 120  Reviewed Pre-eclampsia signs/symptoms  Delivery options reviewed with patient  Signs of labor reviewed  Kick counts reviewed  Activity and Exercise discussed.  No follow-ups on file.    Mariluz Blcok MD  07/15/2025

## 2025-07-17 ENCOUNTER — HOSPITAL ENCOUNTER (INPATIENT)
Facility: HOSPITAL | Age: 35
LOS: 2 days | Discharge: HOME | End: 2025-07-19
Attending: OBSTETRICS & GYNECOLOGY | Admitting: OBSTETRICS & GYNECOLOGY
Payer: COMMERCIAL

## 2025-07-17 ENCOUNTER — ANESTHESIA (OUTPATIENT)
Dept: LABOR AND DELIVERY | Facility: HOSPITAL | Age: 35
End: 2025-07-17
Payer: COMMERCIAL

## 2025-07-17 ENCOUNTER — ANESTHESIA EVENT (OUTPATIENT)
Dept: LABOR AND DELIVERY | Facility: HOSPITAL | Age: 35
End: 2025-07-17
Payer: COMMERCIAL

## 2025-07-17 PROBLEM — O24.410 DIET CONTROLLED GESTATIONAL DIABETES MELLITUS (GDM) IN THIRD TRIMESTER: Status: ACTIVE | Noted: 2025-07-17

## 2025-07-17 LAB
ABO GROUP BLD: NORMAL
BLD GP AB SCN SERPL QL: NEGATIVE
DEPRECATED RDW RBC AUTO: 63.1 FL (ref 37–54)
ERYTHROCYTE [DISTWIDTH] IN BLOOD BY AUTOMATED COUNT: 19.9 % (ref 12.3–15.4)
GLUCOSE BLDC GLUCOMTR-MCNC: 85 MG/DL (ref 70–130)
GLUCOSE BLDC GLUCOMTR-MCNC: 94 MG/DL (ref 70–130)
HCT VFR BLD AUTO: 33 % (ref 34–46.6)
HGB BLD-MCNC: 10.6 G/DL (ref 12–15.9)
MCH RBC QN AUTO: 28.5 PG (ref 26.6–33)
MCHC RBC AUTO-ENTMCNC: 32.1 G/DL (ref 31.5–35.7)
MCV RBC AUTO: 88.7 FL (ref 79–97)
PLATELET # BLD AUTO: 245 10*3/MM3 (ref 140–450)
PMV BLD AUTO: 11.8 FL (ref 6–12)
RBC # BLD AUTO: 3.72 10*6/MM3 (ref 3.77–5.28)
RH BLD: POSITIVE
T&S EXPIRATION DATE: NORMAL
TREPONEMA PALLIDUM IGG+IGM AB [PRESENCE] IN SERUM OR PLASMA BY IMMUNOASSAY: NORMAL
WBC NRBC COR # BLD AUTO: 7.52 10*3/MM3 (ref 3.4–10.8)

## 2025-07-17 PROCEDURE — 86901 BLOOD TYPING SEROLOGIC RH(D): CPT | Performed by: OBSTETRICS & GYNECOLOGY

## 2025-07-17 PROCEDURE — 25010000002 LIDOCAINE-EPINEPHRINE (PF) 1.5 %-1:200000 SOLUTION: Performed by: NURSE ANESTHETIST, CERTIFIED REGISTERED

## 2025-07-17 PROCEDURE — 25810000003 LACTATED RINGERS PER 1000 ML: Performed by: OBSTETRICS & GYNECOLOGY

## 2025-07-17 PROCEDURE — 25010000002 FENTANYL CITRATE (PF) 50 MCG/ML SOLUTION: Performed by: NURSE ANESTHETIST, CERTIFIED REGISTERED

## 2025-07-17 PROCEDURE — 25010000002 ROPIVACAINE PER 1 MG: Performed by: NURSE ANESTHETIST, CERTIFIED REGISTERED

## 2025-07-17 PROCEDURE — 10907ZC DRAINAGE OF AMNIOTIC FLUID, THERAPEUTIC FROM PRODUCTS OF CONCEPTION, VIA NATURAL OR ARTIFICIAL OPENING: ICD-10-PCS | Performed by: OBSTETRICS & GYNECOLOGY

## 2025-07-17 PROCEDURE — 25810000003 LACTATED RINGERS SOLUTION: Performed by: OBSTETRICS & GYNECOLOGY

## 2025-07-17 PROCEDURE — 59025 FETAL NON-STRESS TEST: CPT

## 2025-07-17 PROCEDURE — 59400 OBSTETRICAL CARE: CPT | Performed by: OBSTETRICS & GYNECOLOGY

## 2025-07-17 PROCEDURE — 86850 RBC ANTIBODY SCREEN: CPT | Performed by: OBSTETRICS & GYNECOLOGY

## 2025-07-17 PROCEDURE — 0KQM0ZZ REPAIR PERINEUM MUSCLE, OPEN APPROACH: ICD-10-PCS | Performed by: OBSTETRICS & GYNECOLOGY

## 2025-07-17 PROCEDURE — C1755 CATHETER, INTRASPINAL: HCPCS

## 2025-07-17 PROCEDURE — 86900 BLOOD TYPING SEROLOGIC ABO: CPT | Performed by: OBSTETRICS & GYNECOLOGY

## 2025-07-17 PROCEDURE — 51703 INSERT BLADDER CATH COMPLEX: CPT

## 2025-07-17 PROCEDURE — 86780 TREPONEMA PALLIDUM: CPT | Performed by: OBSTETRICS & GYNECOLOGY

## 2025-07-17 PROCEDURE — 51701 INSERT BLADDER CATHETER: CPT

## 2025-07-17 PROCEDURE — 82948 REAGENT STRIP/BLOOD GLUCOSE: CPT

## 2025-07-17 PROCEDURE — 85027 COMPLETE CBC AUTOMATED: CPT | Performed by: OBSTETRICS & GYNECOLOGY

## 2025-07-17 PROCEDURE — C1755 CATHETER, INTRASPINAL: HCPCS | Performed by: ANESTHESIOLOGY

## 2025-07-17 RX ORDER — OXYTOCIN/0.9 % SODIUM CHLORIDE 30/500 ML
85 PLASTIC BAG, INJECTION (ML) INTRAVENOUS ONCE
Status: DISCONTINUED | OUTPATIENT
Start: 2025-07-17 | End: 2025-07-17 | Stop reason: HOSPADM

## 2025-07-17 RX ORDER — IBUPROFEN 600 MG/1
600 TABLET, FILM COATED ORAL EVERY 6 HOURS PRN
Status: DISCONTINUED | OUTPATIENT
Start: 2025-07-17 | End: 2025-07-19 | Stop reason: HOSPADM

## 2025-07-17 RX ORDER — ONDANSETRON 2 MG/ML
4 INJECTION INTRAMUSCULAR; INTRAVENOUS ONCE AS NEEDED
Status: DISCONTINUED | OUTPATIENT
Start: 2025-07-17 | End: 2025-07-17 | Stop reason: HOSPADM

## 2025-07-17 RX ORDER — ACETAMINOPHEN 500 MG
1000 TABLET ORAL ONCE
Status: DISCONTINUED | OUTPATIENT
Start: 2025-07-17 | End: 2025-07-17 | Stop reason: HOSPADM

## 2025-07-17 RX ORDER — LIDOCAINE HYDROCHLORIDE 10 MG/ML
0.5 INJECTION, SOLUTION EPIDURAL; INFILTRATION; INTRACAUDAL; PERINEURAL ONCE AS NEEDED
Status: DISCONTINUED | OUTPATIENT
Start: 2025-07-17 | End: 2025-07-17 | Stop reason: SDUPTHER

## 2025-07-17 RX ORDER — OXYTOCIN/0.9 % SODIUM CHLORIDE 30/500 ML
250 PLASTIC BAG, INJECTION (ML) INTRAVENOUS CONTINUOUS
OUTPATIENT
Start: 2025-07-17 | End: 2025-07-17

## 2025-07-17 RX ORDER — OXYTOCIN/0.9 % SODIUM CHLORIDE 30/500 ML
650 PLASTIC BAG, INJECTION (ML) INTRAVENOUS ONCE
Status: DISCONTINUED | OUTPATIENT
Start: 2025-07-17 | End: 2025-07-17 | Stop reason: HOSPADM

## 2025-07-17 RX ORDER — SODIUM CHLORIDE 0.9 % (FLUSH) 0.9 %
10 SYRINGE (ML) INJECTION EVERY 12 HOURS SCHEDULED
Status: DISCONTINUED | OUTPATIENT
Start: 2025-07-17 | End: 2025-07-17 | Stop reason: SDUPTHER

## 2025-07-17 RX ORDER — DOCUSATE SODIUM 100 MG/1
100 CAPSULE, LIQUID FILLED ORAL 2 TIMES DAILY
Status: DISCONTINUED | OUTPATIENT
Start: 2025-07-17 | End: 2025-07-19 | Stop reason: HOSPADM

## 2025-07-17 RX ORDER — PROMETHAZINE HYDROCHLORIDE 12.5 MG/1
12.5 TABLET ORAL EVERY 4 HOURS PRN
Status: DISCONTINUED | OUTPATIENT
Start: 2025-07-17 | End: 2025-07-19 | Stop reason: HOSPADM

## 2025-07-17 RX ORDER — PROMETHAZINE HYDROCHLORIDE 12.5 MG/1
12.5 SUPPOSITORY RECTAL EVERY 6 HOURS PRN
OUTPATIENT
Start: 2025-07-17

## 2025-07-17 RX ORDER — MORPHINE SULFATE 2 MG/ML
2 INJECTION, SOLUTION INTRAMUSCULAR; INTRAVENOUS
OUTPATIENT
Start: 2025-07-17 | End: 2025-07-27

## 2025-07-17 RX ORDER — ROPIVACAINE HYDROCHLORIDE 2 MG/ML
15 INJECTION, SOLUTION EPIDURAL; INFILTRATION; PERINEURAL CONTINUOUS
Status: DISCONTINUED | OUTPATIENT
Start: 2025-07-17 | End: 2025-07-17

## 2025-07-17 RX ORDER — METHYLERGONOVINE MALEATE 0.2 MG/ML
200 INJECTION INTRAVENOUS ONCE AS NEEDED
Status: DISCONTINUED | OUTPATIENT
Start: 2025-07-17 | End: 2025-07-17 | Stop reason: HOSPADM

## 2025-07-17 RX ORDER — METOCLOPRAMIDE HYDROCHLORIDE 5 MG/ML
10 INJECTION INTRAMUSCULAR; INTRAVENOUS ONCE AS NEEDED
Status: DISCONTINUED | OUTPATIENT
Start: 2025-07-17 | End: 2025-07-17 | Stop reason: HOSPADM

## 2025-07-17 RX ORDER — HYDROCODONE BITARTRATE AND ACETAMINOPHEN 5; 325 MG/1; MG/1
1 TABLET ORAL EVERY 4 HOURS PRN
Status: DISCONTINUED | OUTPATIENT
Start: 2025-07-17 | End: 2025-07-19 | Stop reason: HOSPADM

## 2025-07-17 RX ORDER — DIPHENHYDRAMINE HYDROCHLORIDE 50 MG/ML
12.5 INJECTION, SOLUTION INTRAMUSCULAR; INTRAVENOUS EVERY 8 HOURS PRN
Status: DISCONTINUED | OUTPATIENT
Start: 2025-07-17 | End: 2025-07-17 | Stop reason: HOSPADM

## 2025-07-17 RX ORDER — BISACODYL 10 MG
10 SUPPOSITORY, RECTAL RECTAL DAILY PRN
Status: DISCONTINUED | OUTPATIENT
Start: 2025-07-18 | End: 2025-07-19 | Stop reason: HOSPADM

## 2025-07-17 RX ORDER — KETOROLAC TROMETHAMINE 30 MG/ML
30 INJECTION, SOLUTION INTRAMUSCULAR; INTRAVENOUS ONCE
Status: DISCONTINUED | OUTPATIENT
Start: 2025-07-17 | End: 2025-07-17 | Stop reason: HOSPADM

## 2025-07-17 RX ORDER — SODIUM CHLORIDE 9 MG/ML
40 INJECTION, SOLUTION INTRAVENOUS AS NEEDED
Status: DISCONTINUED | OUTPATIENT
Start: 2025-07-17 | End: 2025-07-17 | Stop reason: HOSPADM

## 2025-07-17 RX ORDER — ONDANSETRON 2 MG/ML
4 INJECTION INTRAMUSCULAR; INTRAVENOUS EVERY 6 HOURS PRN
Status: DISCONTINUED | OUTPATIENT
Start: 2025-07-17 | End: 2025-07-19 | Stop reason: HOSPADM

## 2025-07-17 RX ORDER — OXYTOCIN/0.9 % SODIUM CHLORIDE 30/500 ML
999 PLASTIC BAG, INJECTION (ML) INTRAVENOUS ONCE
OUTPATIENT
Start: 2025-07-17 | End: 2025-07-17

## 2025-07-17 RX ORDER — ACETAMINOPHEN 325 MG/1
650 TABLET ORAL EVERY 4 HOURS PRN
Status: DISCONTINUED | OUTPATIENT
Start: 2025-07-17 | End: 2025-07-17 | Stop reason: HOSPADM

## 2025-07-17 RX ORDER — MAGNESIUM CARB/ALUMINUM HYDROX 105-160MG
30 TABLET,CHEWABLE ORAL ONCE
Status: DISCONTINUED | OUTPATIENT
Start: 2025-07-17 | End: 2025-07-17 | Stop reason: HOSPADM

## 2025-07-17 RX ORDER — ONDANSETRON 4 MG/1
4 TABLET, ORALLY DISINTEGRATING ORAL EVERY 6 HOURS PRN
Status: DISCONTINUED | OUTPATIENT
Start: 2025-07-17 | End: 2025-07-19 | Stop reason: HOSPADM

## 2025-07-17 RX ORDER — FENTANYL CITRATE 50 UG/ML
INJECTION, SOLUTION INTRAMUSCULAR; INTRAVENOUS AS NEEDED
Status: DISCONTINUED | OUTPATIENT
Start: 2025-07-17 | End: 2025-07-18 | Stop reason: SURG

## 2025-07-17 RX ORDER — SODIUM CHLORIDE 0.9 % (FLUSH) 0.9 %
10 SYRINGE (ML) INJECTION AS NEEDED
Status: DISCONTINUED | OUTPATIENT
Start: 2025-07-17 | End: 2025-07-17 | Stop reason: SDUPTHER

## 2025-07-17 RX ORDER — CITRIC ACID/SODIUM CITRATE 334-500MG
30 SOLUTION, ORAL ORAL ONCE
Status: DISCONTINUED | OUTPATIENT
Start: 2025-07-17 | End: 2025-07-17 | Stop reason: HOSPADM

## 2025-07-17 RX ORDER — ACETAMINOPHEN 325 MG/1
650 TABLET ORAL EVERY 6 HOURS PRN
Status: DISCONTINUED | OUTPATIENT
Start: 2025-07-17 | End: 2025-07-19 | Stop reason: HOSPADM

## 2025-07-17 RX ORDER — FAMOTIDINE 10 MG/ML
20 INJECTION, SOLUTION INTRAVENOUS ONCE AS NEEDED
Status: DISCONTINUED | OUTPATIENT
Start: 2025-07-17 | End: 2025-07-17 | Stop reason: HOSPADM

## 2025-07-17 RX ORDER — MISOPROSTOL 200 UG/1
800 TABLET ORAL AS NEEDED
OUTPATIENT
Start: 2025-07-17

## 2025-07-17 RX ORDER — IBUPROFEN 600 MG/1
600 TABLET, FILM COATED ORAL EVERY 6 HOURS PRN
OUTPATIENT
Start: 2025-07-17

## 2025-07-17 RX ORDER — METHYLERGONOVINE MALEATE 0.2 MG/ML
200 INJECTION INTRAVENOUS ONCE AS NEEDED
OUTPATIENT
Start: 2025-07-17

## 2025-07-17 RX ORDER — SODIUM CHLORIDE 0.9 % (FLUSH) 0.9 %
10 SYRINGE (ML) INJECTION AS NEEDED
Status: DISCONTINUED | OUTPATIENT
Start: 2025-07-17 | End: 2025-07-17 | Stop reason: HOSPADM

## 2025-07-17 RX ORDER — OXYTOCIN/0.9 % SODIUM CHLORIDE 30/500 ML
2-20 PLASTIC BAG, INJECTION (ML) INTRAVENOUS
Status: DISCONTINUED | OUTPATIENT
Start: 2025-07-17 | End: 2025-07-17 | Stop reason: HOSPADM

## 2025-07-17 RX ORDER — HYDROCODONE BITARTRATE AND ACETAMINOPHEN 10; 325 MG/1; MG/1
1 TABLET ORAL EVERY 4 HOURS PRN
Status: DISCONTINUED | OUTPATIENT
Start: 2025-07-17 | End: 2025-07-19 | Stop reason: HOSPADM

## 2025-07-17 RX ORDER — LIDOCAINE HYDROCHLORIDE 10 MG/ML
0.5 INJECTION, SOLUTION EPIDURAL; INFILTRATION; INTRACAUDAL; PERINEURAL ONCE AS NEEDED
Status: DISCONTINUED | OUTPATIENT
Start: 2025-07-17 | End: 2025-07-17 | Stop reason: HOSPADM

## 2025-07-17 RX ORDER — CARBOPROST TROMETHAMINE 250 UG/ML
250 INJECTION, SOLUTION INTRAMUSCULAR AS NEEDED
Status: DISCONTINUED | OUTPATIENT
Start: 2025-07-17 | End: 2025-07-17 | Stop reason: HOSPADM

## 2025-07-17 RX ORDER — SODIUM CHLORIDE, SODIUM LACTATE, POTASSIUM CHLORIDE, CALCIUM CHLORIDE 600; 310; 30; 20 MG/100ML; MG/100ML; MG/100ML; MG/100ML
125 INJECTION, SOLUTION INTRAVENOUS CONTINUOUS
Status: DISCONTINUED | OUTPATIENT
Start: 2025-07-17 | End: 2025-07-17

## 2025-07-17 RX ORDER — LIDOCAINE HYDROCHLORIDE AND EPINEPHRINE 15; 5 MG/ML; UG/ML
INJECTION, SOLUTION EPIDURAL AS NEEDED
Status: DISCONTINUED | OUTPATIENT
Start: 2025-07-17 | End: 2025-07-18 | Stop reason: SURG

## 2025-07-17 RX ORDER — SODIUM CHLORIDE 9 MG/ML
40 INJECTION, SOLUTION INTRAVENOUS AS NEEDED
Status: DISCONTINUED | OUTPATIENT
Start: 2025-07-17 | End: 2025-07-17 | Stop reason: SDUPTHER

## 2025-07-17 RX ORDER — HYDROCORTISONE 25 MG/G
1 CREAM TOPICAL AS NEEDED
Status: DISCONTINUED | OUTPATIENT
Start: 2025-07-17 | End: 2025-07-19 | Stop reason: HOSPADM

## 2025-07-17 RX ORDER — CARBOPROST TROMETHAMINE 250 UG/ML
250 INJECTION, SOLUTION INTRAMUSCULAR AS NEEDED
OUTPATIENT
Start: 2025-07-17

## 2025-07-17 RX ORDER — EPHEDRINE SULFATE 5 MG/ML
INJECTION INTRAVENOUS
Status: DISCONTINUED
Start: 2025-07-17 | End: 2025-07-19 | Stop reason: HOSPADM

## 2025-07-17 RX ORDER — PROMETHAZINE HYDROCHLORIDE 12.5 MG/1
12.5 TABLET ORAL EVERY 6 HOURS PRN
OUTPATIENT
Start: 2025-07-17

## 2025-07-17 RX ORDER — SODIUM CHLORIDE 0.9 % (FLUSH) 0.9 %
10 SYRINGE (ML) INJECTION EVERY 12 HOURS SCHEDULED
Status: DISCONTINUED | OUTPATIENT
Start: 2025-07-17 | End: 2025-07-17 | Stop reason: HOSPADM

## 2025-07-17 RX ORDER — MISOPROSTOL 200 UG/1
800 TABLET ORAL AS NEEDED
Status: DISCONTINUED | OUTPATIENT
Start: 2025-07-17 | End: 2025-07-17 | Stop reason: HOSPADM

## 2025-07-17 RX ORDER — MISOPROSTOL 200 UG/1
800 TABLET ORAL AS NEEDED
Status: DISCONTINUED | OUTPATIENT
Start: 2025-07-17 | End: 2025-07-19 | Stop reason: HOSPADM

## 2025-07-17 RX ORDER — EPHEDRINE SULFATE 5 MG/ML
10 INJECTION INTRAVENOUS
Status: DISCONTINUED | OUTPATIENT
Start: 2025-07-17 | End: 2025-07-17 | Stop reason: HOSPADM

## 2025-07-17 RX ORDER — OXYTOCIN/0.9 % SODIUM CHLORIDE 30/500 ML
125 PLASTIC BAG, INJECTION (ML) INTRAVENOUS ONCE AS NEEDED
Status: DISCONTINUED | OUTPATIENT
Start: 2025-07-17 | End: 2025-07-19 | Stop reason: HOSPADM

## 2025-07-17 RX ORDER — SODIUM CHLORIDE 0.9 % (FLUSH) 0.9 %
1-10 SYRINGE (ML) INJECTION AS NEEDED
Status: DISCONTINUED | OUTPATIENT
Start: 2025-07-17 | End: 2025-07-19 | Stop reason: HOSPADM

## 2025-07-17 RX ADMIN — WITCH HAZEL: 500 SOLUTION RECTAL; TOPICAL at 20:05

## 2025-07-17 RX ADMIN — DOCUSATE SODIUM 100 MG: 100 CAPSULE, LIQUID FILLED ORAL at 20:05

## 2025-07-17 RX ADMIN — IBUPROFEN 600 MG: 600 TABLET, FILM COATED ORAL at 20:05

## 2025-07-17 RX ADMIN — SODIUM CHLORIDE, POTASSIUM CHLORIDE, SODIUM LACTATE AND CALCIUM CHLORIDE 125 ML/HR: 600; 310; 30; 20 INJECTION, SOLUTION INTRAVENOUS at 10:49

## 2025-07-17 RX ADMIN — SODIUM CHLORIDE, POTASSIUM CHLORIDE, SODIUM LACTATE AND CALCIUM CHLORIDE 1000 ML: 600; 310; 30; 20 INJECTION, SOLUTION INTRAVENOUS at 07:14

## 2025-07-17 RX ADMIN — SODIUM CHLORIDE, POTASSIUM CHLORIDE, SODIUM LACTATE AND CALCIUM CHLORIDE 125 ML/HR: 600; 310; 30; 20 INJECTION, SOLUTION INTRAVENOUS at 07:11

## 2025-07-17 RX ADMIN — Medication: at 20:05

## 2025-07-17 RX ADMIN — ROPIVACAINE HYDROCHLORIDE 13 ML/HR: 2 INJECTION, SOLUTION EPIDURAL; INFILTRATION at 10:46

## 2025-07-17 RX ADMIN — LIDOCAINE HYDROCHLORIDE AND EPINEPHRINE 3 ML: 15; 5 INJECTION, SOLUTION EPIDURAL at 10:35

## 2025-07-17 RX ADMIN — LIDOCAINE HYDROCHLORIDE AND EPINEPHRINE 2 ML: 15; 5 INJECTION, SOLUTION EPIDURAL at 10:43

## 2025-07-17 RX ADMIN — FENTANYL CITRATE 100 MCG: 50 INJECTION, SOLUTION INTRAMUSCULAR; INTRAVENOUS at 10:38

## 2025-07-17 RX ADMIN — Medication 1 APPLICATION: at 20:05

## 2025-07-17 RX ADMIN — HYDROCORTISONE 2.5% 1 APPLICATION: 25 CREAM TOPICAL at 20:05

## 2025-07-17 RX ADMIN — ROPIVACAINE HYDROCHLORIDE 3 ML: 5 INJECTION, SOLUTION EPIDURAL; INFILTRATION; PERINEURAL at 10:45

## 2025-07-17 RX ADMIN — Medication 2 MILLI-UNITS/MIN: at 08:15

## 2025-07-17 NOTE — ANESTHESIA PROCEDURE NOTES
Labor Epidural      Patient reassessed immediately prior to procedure    Patient location during procedure: floor  Performed By  CRNA/ARMEN: Erin Doan CRNA  Preanesthetic Checklist  Completed: patient identified, IV checked, site marked, risks and benefits discussed, surgical consent, monitors and equipment checked, pre-op evaluation and timeout performed  Prep:  Pt Position:sitting  Sterile Tech:cap, gloves, mask and sterile barrier  Prep:DuraPrep  Monitoring:blood pressure monitoring  Epidural Block Procedure:  Approach:midline  Guidance:landmark technique and palpation technique  Location:L3-L4  Needle Type:Tuohy  Needle Gauge:17 G  Loss of Resistance Medium: air  Loss of Resistance: 5cm  Cath Depth at skin:10 cm  Paresthesia: none  Aspiration:negative  Test Dose:negative  Number of Attempts: 1  Post Assessment:  Dressing:occlusive dressing applied and secured with tape  Pt Tolerance:patient tolerated the procedure well with no apparent complications  Complications:no

## 2025-07-17 NOTE — H&P
History and Physical:    Subjective     Chief Complaint   Patient presents with    External Cephalic Version       Marian Samuels is a 34 y.o. year old  with an Estimated Date of Delivery: 25 currently at 37w5d presenting with no complaints.  She presented for scheduled ECV, baby was breech 2 days ago, but today is vtx, confirmed with US.     Prenatal care has been with Mariluz Block MD.  It has been significant for large for gestational age and GDM - A1. Her 1 hour PP have been 200, and her 2hPP have been 160-170.         Review of Systems  Pertinent items are noted in HPI.     Past Medical History:   Diagnosis Date    Asthma     History of postpartum hemorrhage     History of third degree perineal laceration     PCOS (polycystic ovarian syndrome)      No past surgical history on file.  Family History   Problem Relation Age of Onset    Hypertension Father     Hypertension Mother     Diabetes type II Maternal Grandmother     Diabetes type II Maternal Grandfather     Heart attack Maternal Uncle     Breast cancer Neg Hx     Colon cancer Neg Hx     Uterine cancer Neg Hx     Ovarian cancer Neg Hx      Social History     Tobacco Use    Smoking status: Never    Smokeless tobacco: Never   Vaping Use    Vaping status: Never Used   Substance Use Topics    Alcohol use: Never    Drug use: Never     Medications Prior to Admission   Medication Sig Dispense Refill Last Dose/Taking    Cholecalciferol 25 MCG (1000 UT) tablet Take 1 tablet by mouth Daily.   Past Week    Prenatal Vit-Fe Fumarate-FA (PRENATAL PO) Take  by mouth.   Past Week     Allergies:  Patient has no known allergies.  OB History    Para Term  AB Living   5 3 3  1 3   SAB IAB Ectopic Molar Multiple Live Births   1    0 3      # Outcome Date GA Lbr Jan/2nd Weight Sex Type Anes PTL Lv   5 Current            4 Term 23 39w6d 05:22 / 00:24 2850 g (6 lb 4.5 oz) F Vag-Spont EPI N JIE   3 Term 20 39w3d 08:45 / 00:44 3240 g (7  "lb 2.3 oz) F Vag-Spont EPI N JIE   2 Term 03/31/18 38w0d  2722 g (6 lb) M Vag-Spont EPI  JIE      Complications: Postpartum hemorrhage   1 SAB 10/01/16 6w0d    SAB            Objective     Ht 157.5 cm (62\")   Wt 67.1 kg (148 lb)   LMP 10/26/2024   Breastfeeding Yes   BMI 27.07 kg/m²     Physical Exam    General:  No acute distress           Abdomen: Gravid, nontender       FHT's: reactive    Cervix: 2/60   [place]: Contraction are irreg     Lab Review   External Prenatal Results       Pregnancy Outside Results - Transcribed From Office Records - See Scanned Records For Details       Test Value Date Time    ABO  O  01/07/25 1445    Rh  Positive  01/07/25 1445    Antibody Screen  Negative  05/13/25 0956       Negative  01/07/25 1445    Varicella IgG       Rubella  5.11 index 01/07/25 1445    Hgb  10.1 g/dL 05/13/25 0956       12.9 g/dL 01/07/25 1445    Hct  30.2 % 05/13/25 0956       39.5 % 01/07/25 1445    HgB A1c        1h GTT  88 mg/dL 05/13/25 0956    3h GTT Fasting       3h GTT 1 hour       3h GTT 2 hour       3h GTT 3 hour        Gonorrhea (discrete)       Chlamydia (discrete)       RPR  Non Reactive  05/13/25 0956       Non Reactive  01/07/25 1445    Syphils cascade: TP-Ab (FTA)       TP-Ab       TP-Ab (EIA)       TPPA       HBsAg  Negative  01/07/25 1445    Herpes Simplex Virus PCR       Herpes Simplex VIrus Culture       HIV  Non Reactive  01/07/25 1445    Hep C RNA Quant PCR       Hep C Antibody  Non Reactive  01/07/25 1445    AFP       NIPT       Cystic Fibrosis ([place])       Cystic Fibroisis        Spinal Muscular atrophy       Fragile X       Group B Strep  No Group B Streptococcus isolated  07/08/25 1919    GBS Susceptibility to Clindamycin       GBS Susceptibility to Erythromycin       Fetal Fibronectin       Genetic Testing, Maternal Blood                 Drug Screening       Test Value Date Time    Urine Drug Screen       Amphetamine Screen  Negative ng/mL 01/07/25 1445    Barbiturate Screen  " Negative ng/mL 01/07/25 1445    Benzodiazepine Screen  Negative ng/mL 01/07/25 1445    Methadone Screen  Negative ng/mL 01/07/25 1445    Phencyclidine Screen  Negative ng/mL 01/07/25 1445    Opiates Screen       THC Screen       Cocaine Screen       Propoxyphene Screen  Negative ng/mL 01/07/25 1445    Buprenorphine Screen       Methamphetamine Screen       Oxycodone Screen       Tricyclic Antidepressants Screen                 Legend    ^: Historical                                Assessment & Plan     ASSESSMENT  IUP at 37w5d, Gestational diabetes, late diagnosis, with poor control and LGA baby. Discussed with PDC.   induction of labor   3. GBBSnegative  PLAN  Admit to labor and delivery   2.  pitocin, FSERIC Block MD  7/17/2025@

## 2025-07-17 NOTE — L&D DELIVERY NOTE
2025    Patient:Marian Samuels    MR#:[id number]    Vaginal Delivery Note  34 y.o. yo female  at 37w5d      Diet controlled gestational diabetes mellitus (GDM) in third trimester       Delivery     Delivery: Vaginal, Spontaneous    YOB: 2025   Time of Birth: 4:12 PM     Anesthesia:      Delivering clinician:     Forceps?   No   Vacuum? No    Shoulder dystocia present: No     Pt progressed to complete, pushed less than 10 min and delivered without difficulty. Baby vigorous and crying. Placenta intact.      Infant    Findings: male infant     Infant observations: Weight: 2985 g (6 lb 9.3 oz)    Observations/Comments:        Apgars:   @ 1 minute /      @ 5 minutes         Placenta, Cord, and Fluid    Placenta delivered  Spontaneous at  2025  4:15 PM    Cord: 3 vessels present.   Nuchal Cord?  no   Cord blood obtained: Yes   Cord gases obtained:  No   Cord gas results: Pending         Repair    Episiotomy: No   Lacerations: Yes  Laceration Information  Laceration Repaired?   Perineal: 2nd Yes   Periurethral:       Labial:       Sulcus:       Vaginal:       Cervical:         Suture used for repair: 2-0 Vicryl     Estimated Blood Loss: 150 mls.         Complications  none    Disposition  Mother to Mother Baby/Postpartum  in stable condition currently.  Baby to remains with mom  in stable condition currently.                Mariluz Block MD  25  16:37 EDT

## 2025-07-17 NOTE — ANESTHESIA PREPROCEDURE EVALUATION
Anesthesia Evaluation     Patient summary reviewed and Nursing notes reviewed   NPO Solid Status: > 6 hours  NPO Liquid Status: < 2 hours           Airway   Mallampati: II  TM distance: >3 FB  Neck ROM: full  Dental      Pulmonary    (+) asthma,  Cardiovascular - negative cardio ROS        Neuro/Psych- negative ROS  GI/Hepatic/Renal/Endo    (+) diabetes mellitus gestational    Musculoskeletal (-) negative ROS    Abdominal    Substance History - negative use     OB/GYN    (+) Pregnant        Other - negative ROS                   Anesthesia Plan    ASA 2     epidural       Anesthetic plan, risks, benefits, and alternatives have been provided, discussed and informed consent has been obtained with: patient.    Plan discussed with attending.    CODE STATUS:    Code Status (Patient has no pulse and is not breathing): CPR (Attempt to Resuscitate)  Medical Interventions (Patient has pulse or is breathing): Full Support  Level Of Support Discussed With: Patient

## 2025-07-18 LAB
ANISOCYTOSIS BLD QL: NORMAL
BASOPHILS # BLD AUTO: 0.06 10*3/MM3 (ref 0–0.2)
BASOPHILS NFR BLD AUTO: 0.5 % (ref 0–1.5)
DEPRECATED RDW RBC AUTO: 65 FL (ref 37–54)
EOSINOPHIL # BLD AUTO: 0.29 10*3/MM3 (ref 0–0.4)
EOSINOPHIL NFR BLD AUTO: 2.5 % (ref 0.3–6.2)
ERYTHROCYTE [DISTWIDTH] IN BLOOD BY AUTOMATED COUNT: 20.1 % (ref 12.3–15.4)
HCT VFR BLD AUTO: 32.5 % (ref 34–46.6)
HGB BLD-MCNC: 10 G/DL (ref 12–15.9)
IMM GRANULOCYTES # BLD AUTO: 0.12 10*3/MM3 (ref 0–0.05)
IMM GRANULOCYTES NFR BLD AUTO: 1 % (ref 0–0.5)
LYMPHOCYTES # BLD AUTO: 2.1 10*3/MM3 (ref 0.7–3.1)
LYMPHOCYTES NFR BLD AUTO: 18.3 % (ref 19.6–45.3)
MCH RBC QN AUTO: 28.2 PG (ref 26.6–33)
MCHC RBC AUTO-ENTMCNC: 30.8 G/DL (ref 31.5–35.7)
MCV RBC AUTO: 91.5 FL (ref 79–97)
MONOCYTES # BLD AUTO: 0.85 10*3/MM3 (ref 0.1–0.9)
MONOCYTES NFR BLD AUTO: 7.4 % (ref 5–12)
NEUTROPHILS NFR BLD AUTO: 70.3 % (ref 42.7–76)
NEUTROPHILS NFR BLD AUTO: 8.04 10*3/MM3 (ref 1.7–7)
NRBC BLD AUTO-RTO: 0 /100 WBC (ref 0–0.2)
PLAT MORPH BLD: NORMAL
PLATELET # BLD AUTO: 224 10*3/MM3 (ref 140–450)
PMV BLD AUTO: 11.7 FL (ref 6–12)
RBC # BLD AUTO: 3.55 10*6/MM3 (ref 3.77–5.28)
WBC MORPH BLD: NORMAL
WBC NRBC COR # BLD AUTO: 11.46 10*3/MM3 (ref 3.4–10.8)

## 2025-07-18 PROCEDURE — 85007 BL SMEAR W/DIFF WBC COUNT: CPT | Performed by: OBSTETRICS & GYNECOLOGY

## 2025-07-18 PROCEDURE — 85025 COMPLETE CBC W/AUTO DIFF WBC: CPT | Performed by: OBSTETRICS & GYNECOLOGY

## 2025-07-18 RX ADMIN — DOCUSATE SODIUM 100 MG: 100 CAPSULE, LIQUID FILLED ORAL at 10:04

## 2025-07-18 RX ADMIN — IBUPROFEN 600 MG: 600 TABLET, FILM COATED ORAL at 20:11

## 2025-07-18 NOTE — LACTATION NOTE
"   25 1020   Maternal Information   Date of Referral 25   Person Making Referral lactation consultant  (newly postpartum)   Maternal Reason for Referral   (previously breastfeed her other 3 children for approximately for 1 year.)   Infant Reason for Referral  infant   Maternal Assessment   Breast Size Issue none   Maternal Infant Feeding   Maternal Emotional State independent  (patient reports that infant just breastfeed for approximately for 1 hour.)   Support Person Involvement other (see comments)  (not present at bedside)   Milk Expression/Equipment   Breast Pump Type double electric, personal  ([place])   Breast Pumping   Breast Pumping Interventions other (see comments)  (can pump for short or missed feeds)   Lactation Referrals   Lactation Referrals outpatient lactation program       Courtesy visit with breastfeeding mother that has an infant in the NICU.  Went over basic breastfeeding information and provided a NICU packet that goes over Hand Expression, Cleaning Pump Parts, Making Milk, Pumping Log, and Storing Milk.  Gave microwave steam bag and instructed to sterilize pump parts every 24 hours while baby is in NICU and to wash pump parts after every use.  Encouraged Pumping every 3 hours, including at night time to help establish the best milk supply.  Also discussed hand expression and encouraged Hand Expression before pumping.  Encouraged to watch [employer] videos, \"Maximizing Milk Production\" and \"Hand Expression.\"  To call lactation services, if there are questions or concerns.    "

## 2025-07-18 NOTE — PROGRESS NOTES
7/18/2025  PPD #1    Subjective   Marian feels well.  Patient describes her lochia less than menses.  Pain is well controlled       Objective   Temp: Temp:  [97.5 °F (36.4 °C)-98.4 °F (36.9 °C)] 98 °F (36.7 °C) Temp src: Oral   BP: BP: ()/(51-78) 91/55        Pulse: Heart Rate:  [] 69  RR: Resp:  [16-20] 20    General:  No acute distress   Abdomen: Fundus firm and beneath umbilicus   Pelvis: deferred     Lab Results   Component Value Date    WBC 11.46 (H) 07/18/2025    HGB 10.0 (L) 07/18/2025    HCT 32.5 (L) 07/18/2025    MCV 91.5 07/18/2025     07/18/2025    HEPBSAG Negative 01/07/2025     Results from last 7 days   Lab Units 07/17/25  0652   TREPONEMA PALLIDUM AB TOTAL  Non-Reactive     Assessment  PPD# 1 after vaginal delivery, doing well; hemodynamically stable  GDMA1--- glucose wnl in labor; dc'd fingersticks    Plan  Routine postpartum care.      This note has been electronically signed.    Monisha Pak, APRN  July 18, 2025

## 2025-07-18 NOTE — ANESTHESIA POSTPROCEDURE EVALUATION
Patient: Marian Samuels    Procedure Summary       Date: 07/17/25 Room / Location:     Anesthesia Start: 1029 Anesthesia Stop:     Procedure: LABOR ANALGESIA Diagnosis:     Scheduled Providers:  Provider: Santos Falcon MD    Anesthesia Type: epidural ASA Status: 2            Anesthesia Type: epidural    Vitals  Vitals Value Taken Time   /56 07/18/25 07:00   Temp 97.9 °F (36.6 °C) 07/18/25 07:00   Pulse 73 07/18/25 07:00   Resp 18 07/18/25 07:00   SpO2 100 % 07/17/25 10:37           Post Anesthesia Care and Evaluation    Patient location during evaluation: bedside  Patient participation: complete - patient participated  Level of consciousness: awake and alert  Pain management: adequate    Airway patency: patent  Anesthetic complications: No anesthetic complications    Cardiovascular status: acceptable  Respiratory status: acceptable  Hydration status: acceptable  Post Neuraxial Block status: Motor and sensory function returned to baseline and No signs or symptoms of PDPH

## 2025-07-19 VITALS
DIASTOLIC BLOOD PRESSURE: 67 MMHG | RESPIRATION RATE: 18 BRPM | TEMPERATURE: 97.5 F | SYSTOLIC BLOOD PRESSURE: 110 MMHG | OXYGEN SATURATION: 100 % | HEIGHT: 62 IN | BODY MASS INDEX: 27.23 KG/M2 | HEART RATE: 63 BPM | WEIGHT: 148 LBS

## 2025-07-19 RX ORDER — IBUPROFEN 600 MG/1
600 TABLET, FILM COATED ORAL EVERY 6 HOURS PRN
Qty: 30 TABLET | Refills: 0 | Status: SHIPPED | OUTPATIENT
Start: 2025-07-19

## 2025-07-19 RX ADMIN — DOCUSATE SODIUM 100 MG: 100 CAPSULE, LIQUID FILLED ORAL at 08:04

## 2025-07-19 NOTE — PLAN OF CARE
Problem: Adult Inpatient Plan of Care  Goal: Plan of Care Review  Outcome: Met  Goal: Patient-Specific Goal (Individualized)  Outcome: Met  Goal: Absence of Hospital-Acquired Illness or Injury  Outcome: Met  Intervention: Identify and Manage Fall Risk  Recent Flowsheet Documentation  Taken 7/19/2025 0804 by Kassandra Jauregui RN  Safety Promotion/Fall Prevention: safety round/check completed  Goal: Optimal Comfort and Wellbeing  Outcome: Met  Intervention: Provide Person-Centered Care  Recent Flowsheet Documentation  Taken 7/19/2025 0804 by Kassandra Jauregui RN  Trust Relationship/Rapport: care explained  Goal: Readiness for Transition of Care  Outcome: Met   Goal Outcome Evaluation:

## 2025-07-19 NOTE — DISCHARGE SUMMARY
Discharge Summary    Date of Admission: 2025  Date of Discharge:  2025      Patient: Marian Samuels      MR#:[id number]    Delivery Provider: Mariluz Block      Presenting Problem/History of Present Illness  Diet controlled gestational diabetes mellitus (GDM) in third trimester [O24.410]       Discharge Diagnosis: Vaginal delivery at 37w5d    Procedures:  Vaginal, Spontaneous    2025   4:12 PM       Hospital Course  Patient is a 34 y.o. female  at 37w5d status post vaginal delivery without complication. Postpartum the patient did well. She remained afebrile, with vital signs stable. She was ready for discharge on postpartum day 2.     Infant:   male fetus 2985 g (6 lb 9.3 oz) with Apgar scores of 8 , 9  at five minutes.    Condition on Discharge:  Stable    Vital Signs  Temp:  [97.5 °F (36.4 °C)-98.1 °F (36.7 °C)] 97.5 °F (36.4 °C)  Heart Rate:  [63-70] 63  Resp:  [16-18] 18  BP: ()/(52-67) 110/67    Lab Results   Component Value Date    WBC 11.46 (H) 2025    HGB 10.0 (L) 2025    HCT 32.5 (L) 2025    MCV 91.5 2025     2025       Discharge Disposition  Home or Self Care    Discharge Medications     Discharge Medications        New Medications        Instructions Start Date   ibuprofen 600 MG tablet  Commonly known as: ADVIL,MOTRIN   600 mg, Oral, Every 6 Hours PRN             Continue These Medications        Instructions Start Date   cholecalciferol 25 MCG (1000 UT) tablet  Commonly known as: VITAMIN D3   1,000 Units, Daily      PRENATAL PO   Take  by mouth.                 Follow-up Appointments  No future appointments.  Additional Instructions for the Follow-ups that You Need to Schedule       Discharge Follow-up with Specified Provider: bartolo; 6 Weeks   As directed      To: bartolo   Follow Up: 6 Weeks                TRESSA Pedroza  25  09:52 EDT  Csd

## 2025-07-21 ENCOUNTER — MATERNAL SCREENING (OUTPATIENT)
Dept: CALL CENTER | Facility: HOSPITAL | Age: 35
End: 2025-07-21
Payer: COMMERCIAL

## 2025-07-21 NOTE — OUTREACH NOTE
Maternal Screening Survey      Flowsheet Row Responses   Eligibility Eligible   Prep survey completed? Yes   Facility patient discharged from? [facility] - Registered Nurse

## 2025-07-29 ENCOUNTER — MATERNAL SCREENING (OUTPATIENT)
Dept: CALL CENTER | Facility: HOSPITAL | Age: 35
End: 2025-07-29
Payer: COMMERCIAL

## 2025-07-29 NOTE — OUTREACH NOTE
Maternal Screening Survey      Flowsheet Row Responses   Facility patient discharged from? [facility]   Attempt successful? Yes   Call start time 1113   Call end time 1117   Person spoke with today (if not patient) and relationship Patient   I have been able to laugh and see the funny side of things. 0   I have looked forward with enjoyment to things. 0   I have blamed myself unnecessarily when things went wrong. 0   I have been anxious or worried for no good reason. 0   I have felt scared or panicky for no good reason. 0   Things have been getting on top of me. 0   I have been so unhappy that I have had difficulty sleeping. 0   I have felt sad or miserable. 0   I have been so unhappy that I have been crying. 0   The thought of harming myself has occurred to me. 0   [place]  Depression Scale Total 0   Did any of your parents have problems with alcohol or drug use? No   Do any of your peers have problems with alcohol or drug use? No   Does your partner have problems with alcohol or drug use? No   Before you were pregnant did you have problems with alcohol or drug use? (past) No   In the past month, did you drink beer, wine, liquor or use any other drugs? (pregnancy) No   Maternal Screening call completed Yes              Jaime CAMARILLO - Registered Nurse

## 2025-08-29 ENCOUNTER — POSTPARTUM VISIT (OUTPATIENT)
Dept: OBSTETRICS AND GYNECOLOGY | Facility: CLINIC | Age: 35
End: 2025-08-29
Payer: COMMERCIAL

## 2025-08-29 VITALS
SYSTOLIC BLOOD PRESSURE: 102 MMHG | BODY MASS INDEX: 23.92 KG/M2 | WEIGHT: 130 LBS | DIASTOLIC BLOOD PRESSURE: 68 MMHG | HEIGHT: 62 IN

## 2025-08-29 PROBLEM — O24.410 DIET CONTROLLED GESTATIONAL DIABETES MELLITUS (GDM) IN THIRD TRIMESTER: Status: RESOLVED | Noted: 2025-07-17 | Resolved: 2025-08-29

## 2025-08-29 PROBLEM — O24.410 DIET CONTROLLED GESTATIONAL DIABETES MELLITUS (GDM), ANTEPARTUM: Status: RESOLVED | Noted: 2025-06-27 | Resolved: 2025-08-29
